# Patient Record
Sex: MALE | Race: WHITE | Employment: OTHER | URBAN - METROPOLITAN AREA
[De-identification: names, ages, dates, MRNs, and addresses within clinical notes are randomized per-mention and may not be internally consistent; named-entity substitution may affect disease eponyms.]

---

## 2016-11-16 LAB — COLONOSCOPY, EXTERNAL: NORMAL

## 2017-01-18 ENCOUNTER — HOSPITAL ENCOUNTER (OUTPATIENT)
Dept: GENERAL RADIOLOGY | Age: 67
Discharge: HOME | End: 2017-01-18
Payer: MEDICARE

## 2017-01-18 ENCOUNTER — DOCUMENTATION ONLY (OUTPATIENT)
Dept: INTERNAL MEDICINE CLINIC | Age: 67
End: 2017-01-18

## 2017-01-18 ENCOUNTER — OFFICE VISIT (OUTPATIENT)
Dept: INTERNAL MEDICINE CLINIC | Age: 67
End: 2017-01-18

## 2017-01-18 VITALS
OXYGEN SATURATION: 96 % | WEIGHT: 190 LBS | BODY MASS INDEX: 31.65 KG/M2 | SYSTOLIC BLOOD PRESSURE: 160 MMHG | TEMPERATURE: 98.9 F | HEART RATE: 81 BPM | HEIGHT: 65 IN | RESPIRATION RATE: 16 BRPM | DIASTOLIC BLOOD PRESSURE: 70 MMHG
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
## 2017-01-18 DIAGNOSIS — M25.472 LEFT ANKLE SWELLING: Primary | ICD-10-CM

## 2017-01-18 DIAGNOSIS — M25.472 LEFT ANKLE SWELLING: ICD-10-CM

## 2017-01-18 PROCEDURE — 73610 X-RAY EXAM OF ANKLE: CPT

## 2017-01-18 NOTE — MR AVS SNAPSHOT
Visit Information Date & Time Provider Department Dept. Phone Encounter #  
 1/18/2017  1:30 PM Denita Sever, NP [address] [facility] [phone] Your Appointments 3/8/2017  9:40 AM  
ACUTE CARE with [facility] ([address]) Appt Note: 6m hypertension, hyperlipidemia fu/ [address] [address] [facility] [facility] [address], [address] [place] [address]  
[address], [place] [address] [facility] [address] Upcoming Health Maintenance Date Due  
 GLAUCOMA SCREENING Q2Y 1/30/2017 Pneumococcal 65+ Low/Medium Risk (1 of 2 - PCV13) 7/1/2017* MEDICARE YEARLY EXAM 7/1/2017* DTaP/Tdap/Td series (2 - Td) 1/10/2024 COLONOSCOPY 11/16/2026 *Topic was postponed. The date shown is not the original due date. Allergies as of 1/18/2017  Review Complete On: 12/29/2016 By: Jina Vásquez LPN No Known Allergies Current Immunizations  Never Reviewed Name Date Influenza High Dose Vaccine PF 9/14/2016 11:40 AM  
 Influenza Vaccine 12/1/2015, 12/1/2013 Tdap 1/10/2014  1:07 PM  
 Zoster Vaccine, Live 12/1/2014 Not reviewed this visit You Were Diagnosed With   
  
 Codes Comments Left ankle swelling    -  Primary ICD-10-CM: M25.472 ICD-9-CM: 719.07 Fall (on) (from) other stairs and steps, initial encounter     ICD-10-CM: W10. Mya Flash ICD-9-CM: E880.9 Vitals BP Pulse Temp Resp Height(growth percentile) Weight(growth percentile) 160/70 (BP 1 Location: Left arm, BP Patient Position: Sitting) 81 98.9 °F (37.2 °C) (Oral) 16 5' 4.5\" (1.638 m) 190 lb (86.2 kg) SpO2 BMI Smoking Status 96% 32.11 kg/m2 Never Smoker Vitals History BMI and BSA Data Body Mass Index Body Surface Area  
 32.11 kg/m 2 1.98 m 2 Preferred Pharmacy Pharmacy Name Phone  [id number] [place], [address] [facility] [address] [phone] Your Updated Medication List  
  
   
This list is accurate as of: 1/18/17  2:03 PM.  Always use your most recent med list.  
  
  
  
  
 ASPIRIN PO Take 81 mg by mouth daily. atorvastatin 10 mg tablet Commonly known as:  LIPITOR  
TAKE 1 TABLET BY MOUTH EVERY NIGHT AT BEDTIME  
  
 lisinopril 10 mg tablet Commonly known as:  PRINIVIL, ZESTRIL  
TAKE 1 TABLET BY MOUTH DAILY METAMUCIL Powd Generic drug:  psyllium seed-sucrose Take  by mouth. Use twice daily To-Do List   
 01/18/2017 Imaging:  XR ANKLE LT MIN 3 V   
  
 03/08/2017 10:20 AM  
  Appointment with Mena Cummings at [place] [address] [facility] ([phone]) Patient Instructions Learning About RICE (Rest, Ice, Compression, and Elevation) What is RICE? RICE is a way to care for an injury. RICE helps relieve pain and swelling. It may also help with healing and flexibility. RICE stands for: · Rest and protect the injured or sore area. · Ice or a cold pack used as soon as possible. · Compression, or wrapping the injured or sore area with an elastic bandage. · Elevation (propping up) the injured or sore area. How do you do RICE? You can use RICE for home treatment when you have general aches and pains or after an injury or surgery. Rest 
· Do not put weight on the injury for at least 24 to 48 hours. · Use crutches for a badly sprained knee or ankle. · Support a sprained wrist, elbow, or shoulder with a sling. Ice · Put ice or a cold pack on the injury right away to reduce pain and swelling. Frozen vegetables will also work as an ice pack. Put a thin cloth between the ice or cold pack and your skin. The cloth protects the injured area from getting too cold. · Use ice for 10 to 15 minutes at a time for the first 48 to 72 hours. Compression · Use compression for sprains, strains, and surgeries of the arms and legs. · Wrap the injured area with an elastic bandage or compression sleeve to reduce swelling. · Don't wrap it too tightly. If the area below it feels numb, tingles, or feels cool, loosen the wrap. Elevation · Use elevation for areas of the body that can be propped up, such as arms and legs. · Prop up the injured area on pillows whenever you use ice. Keep it propped up anytime you sit or lie down. · Try to keep the injured area at or above the level of your heart. This will help reduce swelling and bruising. Where can you learn more? Go to http://jw-ryan.info/. Enter J887 in the search box to learn more about \"Learning About RICE (Rest, Ice, Compression, and Elevation). \" 
Current as of: May 23, 2016 Content Version: 11.1 © 0817-2195 [employer]. Care instructions adapted under license by [employer] (which disclaims liability or warranty for this information). If you have questions about a medical condition or this instruction, always ask your healthcare professional. [place] [address] any warranty or liability for your use of this information. Ankle Sprain: Care Instructions Your Care Instructions An ankle sprain can happen when you twist your ankle. The ligaments that support the ankle can get stretched and torn. Often the ankle is swollen and painful. Ankle sprains may take from several weeks to several months to heal. Usually, the more pain and swelling you have, the more severe your ankle sprain is and the longer it will take to heal. You can heal faster and regain strength in your ankle with good home treatment. It is very important to give your ankle time to heal completely, so that you do not easily hurt your ankle again. Follow-up care is a key part of your treatment and safety.  Be sure to make and go to all appointments, and call your doctor if you are having problems. It's also a good idea to know your test results and keep a list of the medicines you take. How can you care for yourself at home? · Prop up your foot on pillows as much as possible for the next 3 days. Try to keep your ankle above the level of your heart. This will help reduce the swelling. · Follow your doctor's directions for wearing a splint or elastic bandage. Wrapping the ankle may help reduce or prevent swelling. · Your doctor may give you a splint, a brace, an air stirrup, or another form of ankle support to protect your ankle until it is healed. Wear it as directed while your ankle is healing. Do not remove it unless your doctor tells you to. After your ankle has healed, ask your doctor whether you should wear the brace when you exercise. · Put ice or cold packs on your injured ankle for 10 to 20 minutes at a time. Try to do this every 1 to 2 hours for the next 3 days (when you are awake) or until the swelling goes down. Put a thin cloth between the ice and your skin. · You may need to use crutches until you can walk without pain. If you do use crutches, try to bear some weight on your injured ankle if you can do so without pain. This helps the ankle heal. 
· Take pain medicines exactly as directed. ¨ If the doctor gave you a prescription medicine for pain, take it as prescribed. ¨ If you are not taking a prescription pain medicine, ask your doctor if you can take an over-the-counter medicine. · If you have been given ankle exercises to do at home, do them exactly as instructed. These can promote healing and help prevent lasting weakness. When should you call for help? Call your doctor now or seek immediate medical care if: 
· Your pain is getting worse. · Your swelling is getting worse. · Your splint feels too tight or you are unable to loosen it. Watch closely for changes in your health, and be sure to contact your doctor if: 
· You are not getting better after 1 week. Where can you learn more? Go to http://jw-ryan.info/. Enter F969 in the search box to learn more about \"Ankle Sprain: Care Instructions. \" Current as of: May 23, 2016 Content Version: 11.1 © 0483-7978 [employer]. Care instructions adapted under license by [employer] (which disclaims liability or warranty for this information). If you have questions about a medical condition or this instruction, always ask your healthcare professional. [address] any warranty or liability for your use of this information. Introducing \[facility] [place] [facility]\"" & HEALTH SERVICES! Vanessa Block introduces [employer] patient portal. Now you can access parts of your medical record, email your doctor's office, and request medication refills online. 1. In your internet browser, go to [email or web address] 2. Click on the First Time User? Click Here link in the Sign In box. You will see the New Member Sign Up page. 3. Enter your [employer] Access Code exactly as it appears below. You will not need to use this code after youve completed the sign-up process. If you do not sign up before the expiration date, you must request a new code. · [employer] Access Code: [email or web address] Expires: 4/18/2017  2:02 PM 
 
4. Enter the last four digits of your Social Security Number (xxxx) and Date of Birth (mm/dd/yyyy) as indicated and click Submit. You will be taken to the next sign-up page. 5. Create a Nimsoftt ID. This will be your [employer] login ID and cannot be changed, so think of one that is secure and easy to remember. 6. Create a [employer] password. You can change your password at any time. 7. Enter your Password Reset Question and Answer. This can be used at a later time if you forget your password. 8. Enter your e-mail address. You will receive e-mail notification when new information is available in [address] 9. Click Sign Up. You can now view and download portions of your medical record. 10. Click the Download Summary menu link to download a portable copy of your medical information. If you have questions, please visit the Frequently Asked Questions section of the [email or web address] website. Remember, [email or web address] is NOT to be used for urgent needs. For medical emergencies, dial 911. Now available from your iPhone and Android! Please provide this summary of care documentation to your next provider. Your primary care clinician is listed as Brandi Bassett. If you have any questions after today's visit, please call [phone].

## 2017-01-18 NOTE — PATIENT INSTRUCTIONS
Learning About RICE (Rest, Ice, Compression, and Elevation)  What is RICE? RICE is a way to care for an injury. RICE helps relieve pain and swelling. It may also help with healing and flexibility. RICE stands for:  · Rest and protect the injured or sore area. · Ice or a cold pack used as soon as possible. · Compression, or wrapping the injured or sore area with an elastic bandage. · Elevation (propping up) the injured or sore area. How do you do RICE? You can use RICE for home treatment when you have general aches and pains or after an injury or surgery. Rest  · Do not put weight on the injury for at least 24 to 48 hours. · Use crutches for a badly sprained knee or ankle. · Support a sprained wrist, elbow, or shoulder with a sling. Ice  · Put ice or a cold pack on the injury right away to reduce pain and swelling. Frozen vegetables will also work as an ice pack. Put a thin cloth between the ice or cold pack and your skin. The cloth protects the injured area from getting too cold. · Use ice for 10 to 15 minutes at a time for the first 48 to 72 hours. Compression  · Use compression for sprains, strains, and surgeries of the arms and legs. · Wrap the injured area with an elastic bandage or compression sleeve to reduce swelling. · Don't wrap it too tightly. If the area below it feels numb, tingles, or feels cool, loosen the wrap. Elevation  · Use elevation for areas of the body that can be propped up, such as arms and legs. · Prop up the injured area on pillows whenever you use ice. Keep it propped up anytime you sit or lie down. · Try to keep the injured area at or above the level of your heart. This will help reduce swelling and bruising. Where can you learn more? Go to http://jw-ryan.info/. Enter K084 in the search box to learn more about \"Learning About RICE (Rest, Ice, Compression, and Elevation). \"  Current as of: May 23, 2016  Content Version: 11.1  © 2522-0902 Healthwise, Incorporated. Care instructions adapted under license by [employer] (which disclaims liability or warranty for this information). If you have questions about a medical condition or this instruction, always ask your healthcare professional. [place] [address] any warranty or liability for your use of this information. Ankle Sprain: Care Instructions  Your Care Instructions    An ankle sprain can happen when you twist your ankle. The ligaments that support the ankle can get stretched and torn. Often the ankle is swollen and painful. Ankle sprains may take from several weeks to several months to heal. Usually, the more pain and swelling you have, the more severe your ankle sprain is and the longer it will take to heal. You can heal faster and regain strength in your ankle with good home treatment. It is very important to give your ankle time to heal completely, so that you do not easily hurt your ankle again. Follow-up care is a key part of your treatment and safety. Be sure to make and go to all appointments, and call your doctor if you are having problems. It's also a good idea to know your test results and keep a list of the medicines you take. How can you care for yourself at home? · Prop up your foot on pillows as much as possible for the next 3 days. Try to keep your ankle above the level of your heart. This will help reduce the swelling. · Follow your doctor's directions for wearing a splint or elastic bandage. Wrapping the ankle may help reduce or prevent swelling. · Your doctor may give you a splint, a brace, an air stirrup, or another form of ankle support to protect your ankle until it is healed. Wear it as directed while your ankle is healing. Do not remove it unless your doctor tells you to. After your ankle has healed, ask your doctor whether you should wear the brace when you exercise.   · Put ice or cold packs on your injured ankle for 10 to 20 minutes at a time. Try to do this every 1 to 2 hours for the next 3 days (when you are awake) or until the swelling goes down. Put a thin cloth between the ice and your skin. · You may need to use crutches until you can walk without pain. If you do use crutches, try to bear some weight on your injured ankle if you can do so without pain. This helps the ankle heal.  · Take pain medicines exactly as directed. ¨ If the doctor gave you a prescription medicine for pain, take it as prescribed. ¨ If you are not taking a prescription pain medicine, ask your doctor if you can take an over-the-counter medicine. · If you have been given ankle exercises to do at home, do them exactly as instructed. These can promote healing and help prevent lasting weakness. When should you call for help? Call your doctor now or seek immediate medical care if:  · Your pain is getting worse. · Your swelling is getting worse. · Your splint feels too tight or you are unable to loosen it. Watch closely for changes in your health, and be sure to contact your doctor if:  · You are not getting better after 1 week. Where can you learn more? Go to http://jw-ryan.info/. Enter E543 in the search box to learn more about \"Ankle Sprain: Care Instructions. \"  Current as of: May 23, 2016  Content Version: 11.1  © 3275-6147 Healthwise, Incorporated. Care instructions adapted under license by [employer] (which disclaims liability or warranty for this information). If you have questions about a medical condition or this instruction, always ask your healthcare professional. [place] [address] any warranty or liability for your use of this information.

## 2017-01-18 NOTE — PROGRESS NOTES
Chief Complaint   Patient presents with   [facility] Fall      left ankle sprain     Reviewed record in preparation for visit and have obtained necessary documentation. Identified pt with two pt identifiers(name and ). Health Maintenance Due   Topic    GLAUCOMA SCREENING Q2Y          Chief Complaint   Patient presents with    Fall      left ankle sprain        Wt Readings from Last 3 Encounters:   17 190 lb (86.2 kg)   16 191 lb (86.6 kg)   16 193 lb (87.5 kg)     Temp Readings from Last 3 Encounters:   17 98.9 °F (37.2 °C) (Oral)   16 97.9 °F (36.6 °C) (Oral)   16 98.5 °F (36.9 °C)     BP Readings from Last 3 Encounters:   17 160/70   16 140/70   16 109/55     Pulse Readings from Last 3 Encounters:   17 81   16 82   16 65           Learning Assessment:  :     Learning Assessment 3/11/2016 2014 1/10/2014   PRIMARY LEARNER Patient Patient Patient   HIGHEST LEVEL OF EDUCATION - PRIMARY LEARNER  4 YEARS OF COLLEGE - -   BARRIERS PRIMARY LEARNER NONE NONE NONE   CO-LEARNER CAREGIVER No No No   CO-LEARNER HIGHEST LEVEL OF EDUCATION - - 2 YEARS OF COLLEGE   PRIMARY LANGUAGE ENGLISH ENGLISH ENGLISH    NEED No No No   LEARNER PREFERENCE PRIMARY LISTENING DEMONSTRATION READING   LEARNING SPECIAL TOPICS - no no   ANSWERED BY patient patient patient   RELATIONSHIP SELF SELF SELF       Depression Screening:  :     PHQ 2 / 9, over the last two weeks 2016   Little interest or pleasure in doing things Not at all   Feeling down, depressed or hopeless Not at all   Total Score PHQ 2 0       Fall Risk Assessment:  :     Fall Risk Assessment, last 12 mths 2016   Able to walk? Yes   Fall in past 12 months? No       Abuse Screening:  :     Abuse Screening Questionnaire 3/11/2016 9/15/2014   Do you ever feel afraid of your partner? N N   Are you in a relationship with someone who physically or mentally threatens you? N N   Is it safe for you to go home? Y Y       Coordination of Care Questionnaire:  :     1) Have you been to an emergency room, urgent care clinic since your last visit? no   Hospitalized since your last visit? no             2) Have you seen or consulted any other health care providers outside of [address] [place] [address] since your last visit? no  (Include any pap smears or colon screenings in this section.)    3) Do you have an Advance Directive on file? no    4) Are you interested in receiving information on Advance Directives? NO      Patient is accompanied by self I have received verbal consent from Tyra Orozco to discuss any/all medical information while they are present in the room. Reviewed record  In preparation for visit and have obtained necessary documentation.

## 2017-01-18 NOTE — PROGRESS NOTES
HISTORY OF PRESENT ILLNESS  Madiha Herrera is a 78 y.o. male. Patient reports he fell down three steps last night and injured left ankle. He applied ice last night, but not today. He is able to walk, but is slightly painful. Fall   The history is provided by the patient. The accident occurred yesterday. Fall occurred: down 3 stairs. Pain location: left ankle. The pain is mild. Pertinent negatives include no loss of consciousness and no laceration. The symptoms are aggravated by ambulation. He has tried acetaminophen for the symptoms. The treatment provided no relief. Review of Systems   Musculoskeletal: Positive for falls. Left ankle pain and swelling   Neurological: Negative for loss of consciousness. Physical Exam   Constitutional: He is oriented to person, place, and time. He appears well-developed and well-nourished. Musculoskeletal:        Left ankle: He exhibits decreased range of motion (dorsiflexion), swelling (left lateral malleolus) and ecchymosis (minimal around lateral malleolus). He exhibits no deformity, no laceration and normal pulse. Tenderness. Lateral malleolus (mild) tenderness found. Neurological: He is alert and oriented to person, place, and time. Skin: Skin is warm and dry. No laceration noted. Psychiatric: He has a normal mood and affect. ASSESSMENT and PLAN    ICD-10-CM ICD-9-CM    1. Left ankle swelling M25.472 719.07 XR ANKLE LT MIN 3 V   2. Fall (on) (from) other stairs and steps, initial encounter W10. 8XXA E880.9 XR ANKLE LT MIN 3 V     Orders Placed This Encounter    XR ANKLE LT MIN 3 V   advised patient it is most likely just a sprain, but xray ordered to confirm  Ice q 2-3 hours for 2 days for 10-15 minutes  Ibuprofen for swelling and pain  Ace wrap for the next week, possibly longer  Patient informed ankle may be sore for 4-6 weeks

## 2017-01-21 RX ORDER — ATORVASTATIN CALCIUM 10 MG/1
TABLET, FILM COATED ORAL
Qty: 90 TAB | Refills: 1 | Status: SHIPPED | OUTPATIENT
Start: 2017-01-21 | End: 2017-07-18 | Stop reason: SDUPTHER

## 2017-02-05 RX ORDER — LISINOPRIL 10 MG/1
TABLET ORAL
Qty: 90 TAB | Refills: 2 | Status: SHIPPED | OUTPATIENT
Start: 2017-02-05 | End: 2017-11-09 | Stop reason: SDUPTHER

## 2017-03-08 ENCOUNTER — OFFICE VISIT (OUTPATIENT)
Dept: INTERNAL MEDICINE CLINIC | Age: 67
End: 2017-03-08

## 2017-03-08 ENCOUNTER — HOSPITAL ENCOUNTER (OUTPATIENT)
Dept: LAB | Age: 67
Discharge: HOME | End: 2017-03-08
Payer: MEDICARE

## 2017-03-08 VITALS
WEIGHT: 192 LBS | SYSTOLIC BLOOD PRESSURE: 118 MMHG | BODY MASS INDEX: 31.99 KG/M2 | HEIGHT: 65 IN | OXYGEN SATURATION: 98 % | HEART RATE: 68 BPM | DIASTOLIC BLOOD PRESSURE: 72 MMHG | TEMPERATURE: 97.7 F

## 2017-03-08 DIAGNOSIS — S93.402D SPRAIN OF LEFT ANKLE, UNSPECIFIED LIGAMENT, SUBSEQUENT ENCOUNTER: ICD-10-CM

## 2017-03-08 DIAGNOSIS — Z13.5 GLAUCOMA SCREENING: ICD-10-CM

## 2017-03-08 DIAGNOSIS — I10 BENIGN ESSENTIAL HYPERTENSION: Primary | ICD-10-CM

## 2017-03-08 DIAGNOSIS — Z71.89 ADVANCED CARE PLANNING/COUNSELING DISCUSSION: ICD-10-CM

## 2017-03-08 DIAGNOSIS — R73.9 HYPERGLYCEMIA: ICD-10-CM

## 2017-03-08 DIAGNOSIS — Z87.898 HISTORY OF ELEVATED PSA: ICD-10-CM

## 2017-03-08 DIAGNOSIS — Z23 ENCOUNTER FOR IMMUNIZATION: ICD-10-CM

## 2017-03-08 DIAGNOSIS — E78.00 HYPERCHOLESTEROLEMIA: ICD-10-CM

## 2017-03-08 DIAGNOSIS — R73.03 PREDIABETES: ICD-10-CM

## 2017-03-08 DIAGNOSIS — Z00.00 MEDICARE ANNUAL WELLNESS VISIT, SUBSEQUENT: ICD-10-CM

## 2017-03-08 PROCEDURE — 85027 COMPLETE CBC AUTOMATED: CPT

## 2017-03-08 PROCEDURE — 80053 COMPREHEN METABOLIC PANEL: CPT

## 2017-03-08 PROCEDURE — 80061 LIPID PANEL: CPT

## 2017-03-08 PROCEDURE — 36415 COLL VENOUS BLD VENIPUNCTURE: CPT

## 2017-03-08 PROCEDURE — 83036 HEMOGLOBIN GLYCOSYLATED A1C: CPT

## 2017-03-08 NOTE — PROGRESS NOTES
Jose Youssef is a 78 y.o. male and presents for annual Medicare Wellness Visit. Problem List: Reviewed with patient and discussed risk factors. Patient Active Problem List   Diagnosis Code    Incomplete RBBB I45.10    Hypercholesterolemia E78.00    Benign essential hypertension I10    Prediabetes R73.03    Obesity E66.9    History of elevated PSA Z87.898    Advanced care planning/counseling discussion Z71.89       Current medical providers:  Patient Care Team:  JOE Cao as PCP - General (Physician Assistant)    PSH: Reviewed with patient  Past Surgical History:   Procedure Laterality Date    COLONOSCOPY N/A 11/16/2016    COLONOSCOPY performed by Fabio Clark [facility] at [facility] ENDOSCOPY    HX COLONOSCOPY  11/17/16    Dr. Shaneka Adair        SH: Reviewed with patient  Social History   Substance Use Topics    Smoking status: Never Smoker    Smokeless tobacco: Never Used    Alcohol use 0.0 oz/week     0 Standard drinks or equivalent per week      Comment: rarely       FH: Reviewed with patient  Family History   Problem Relation Age of Onset    Stroke Mother     Diabetes Mother     Cancer Father      colon    Cancer Brother      bone    No Known Problems Son        Medications/Allergies: Reviewed with patient  Current Outpatient Prescriptions on File Prior to Visit   Medication Sig Dispense Refill    lisinopril (PRINIVIL, ZESTRIL) 10 mg tablet TAKE 1 TABLET BY MOUTH DAILY 90 Tab 2    atorvastatin (LIPITOR) 10 mg tablet TAKE 1 TABLET BY MOUTH EVERY NIGHT AT BEDTIME 90 Tab 1    ASPIRIN PO Take 81 mg by mouth daily.  psyllium seed-sucrose (METAMUCIL) powd Take  by mouth. Use twice daily       No current facility-administered medications on file prior to visit.        No Known Allergies    Objective:  Visit Vitals    /72 (BP 1 Location: Left arm, BP Patient Position: Sitting)    Pulse 68    Temp 97.7 °F (36.5 °C) (Oral)    Ht 5' 4.57\" (1.64 m)    Wt 192 lb (87.1 kg)    SpO2 98%    BMI 32.38 kg/m2    Body mass index is 32.38 kg/(m^2). Assessment of cognitive impairment: Alert and oriented x 3    Depression Screen:   PHQ 2 / 9, over the last two weeks 3/8/2017   Little interest or pleasure in doing things Not at all   Feeling down, depressed or hopeless Not at all   Total Score PHQ 2 0       Fall Risk Assessment:    Fall Risk Assessment, last 12 mths 3/8/2017   Able to walk? Yes   Fall in past 12 months? Yes   Fall with injury? Yes   Number of falls in past 12 months 1   Fall Risk Score 2       Functional Ability:   Does the patient exhibit a steady gait? yes   How long did it take the patient to get up and walk from a sitting position? 5 seconds   Is the patient self reliant?  (ie can do own laundry, meals, household chores)  yes     Does the patient handle his/her own medications? yes     Does the patient handle his/her own money? yes     Is the patients home safe (ie good lighting, handrails on stairs and bath, etc.)? yes     Did you notice or did patient express any hearing difficulties? Yes; mild     Did you notice or did patient express any vision difficulties?   no     Were distance and reading eye charts used? no       Advance Care Planning:   Patient was offered the opportunity to discuss advance care planning:  yes     Does patient have an Advance Directive:  no   If no, did you provide information on Caring Connections?   yes       Plan:      Orders Placed This Encounter    METABOLIC PANEL, COMPREHENSIVE    LIPID PANEL    HEMOGLOBIN A1C WITH EAG    CBC W/O DIFF    REFERRAL TO OPHTHALMOLOGY    pneumococcal 13 avel conj dip (PREVNAR-13) 0.5 mL syrg injection       Health Maintenance   Topic Date Due    GLAUCOMA SCREENING Q2Y  01/30/2017    Pneumococcal 65+ Low/Medium Risk (1 of 2 - PCV13) 07/01/2017 (Originally 7/30/2014)    MEDICARE YEARLY EXAM  03/09/2018    DTaP/Tdap/Td series (2 - Td) 01/10/2024    COLONOSCOPY  11/16/2026    Hepatitis C Screening Completed    ZOSTER VACCINE AGE 60>  Addressed    INFLUENZA AGE 9 TO ADULT  Completed       *Patient verbalized understanding and agreement with the plan. A copy of the After Visit Summary with personalized health plan was given to the patient today. Patient here today for a subsequent medicare wellness visit. Mr. Aline Malik is independent in all ADL's. Exercise consists of walking and the elliptical and he does one or the other every day. UTD on flu, TDAP and zostavax. ADM provided rx for Prevnar 13 today. Patient is due for an eye exam and will schedule  Last colonoscopy was 11/2016; normal (Dr. Farah Nittany)  Labs ordered today. Patient was advised to f/u with urology. Believes he has an advanced medical directive with his will. Encouraged him to check for the documentation and offered to scan a copy into the chart. Provided a copy of AMD form in case he does not have one at home.

## 2017-03-08 NOTE — PROGRESS NOTES
Chief Complaint   Patient presents with    Hypertension     6 month f/u    Cholesterol Problem    Annual Wellness Visit

## 2017-03-08 NOTE — LETTER
3/9/2017 6:18 AM 
 
RE:    Hafsa Hinson [phone] [id number] Thank you for agreeing to see Zandra Berkowitz I am referring my patient to you for evaluation of elevated PSA velocity. Please see his 
pertinent patient information enclosed. I appreciate your assistance in Mr. Wyatt Chen care  and look forward to your findings and recommendations. Sincerely, JOE Artis

## 2017-03-08 NOTE — PROGRESS NOTES
Subjective:      Debbie Hillman is a 78 y.o. male who presents today for HTN and HLD follow up with me and medicare wellness with Solomon. He still exercises 3-4x/week (walking, elliptical). He monitors his diet closely. No significant weight change. Reports adherence with all medications. Denies HA, dizziness, chest pain, SOB, peripheral edema. He was diagnosed with L ankle sprain by Paris Triplett in January. He notes that it is \"much better\" but he still has a little swelling. Denies any pain. He has not followed up with Dr. Cherylene Figures yet. \"I was going to go this month. \"    Due for glaucoma exam and prevnar-13. Current Outpatient Prescriptions   Medication Sig Dispense Refill    lisinopril (PRINIVIL, ZESTRIL) 10 mg tablet TAKE 1 TABLET BY MOUTH DAILY 90 Tab 2    atorvastatin (LIPITOR) 10 mg tablet TAKE 1 TABLET BY MOUTH EVERY NIGHT AT BEDTIME 90 Tab 1    ASPIRIN PO Take 81 mg by mouth daily.  psyllium seed-sucrose (METAMUCIL) powd Take  by mouth. Use twice daily       No Known Allergies  Past Medical History:   Diagnosis Date    Benign essential hypertension     History of elevated PSA     2.4 (11/2012), 3.6 (1/2014), 2.1 (3/2015) evaluated by Dr. Charlene Domingo, repeat PSA 1.9 (2/21/14); 2.1 (3/16/15), 2.4 (3/11/16), 2.7 (9/2016) - referred back to Dr. Escobar Sessions Hypercholesterolemia     Incomplete RBBB 1/10/14    Obesity     Prediabetes 5/13/14        Review of Systems    Pertinent items are noted in HPI.      Objective:     Visit Vitals    /72 (BP 1 Location: Left arm, BP Patient Position: Sitting)    Pulse 68    Temp 97.7 °F (36.5 °C) (Oral)    Ht 5' 4.57\" (1.64 m)    Wt 192 lb (87.1 kg)    SpO2 98%    BMI 32.38 kg/m2     General appearance: alert, cooperative, no distress, appears stated age, pleasant, white, overweight appearing male  Neck: supple, symmetrical, trachea midline, no adenopathy and no carotid bruit  Lungs: clear to auscultation bilaterally  Heart: regular rate and rhythm, S1, S2 normal, no murmur, click, rub or gallop  Abdomen: soft, non-tender. Bowel sounds normal. No masses,  no organomegaly  Rectal: Deferred to Urology  Extremities: mild non-pitting edema inferior to lateral malleolus, non-tender, no skin changes, full ROM, non-tender  Pulses: pt 2+ and symmetric  Neurologic: Grossly normal    Assessment/Plan:   Benign essential hypertension - well controlled  -     CBC W/O DIFF    Hypercholesterolemia  -     METABOLIC PANEL, COMPREHENSIVE  -     LIPID PANEL    Prediabetes  -     METABOLIC PANEL, COMPREHENSIVE  -     HEMOGLOBIN A1C WITH EAG    Hyperglycemia  -     METABOLIC PANEL, COMPREHENSIVE  -     HEMOGLOBIN A1C WITH EAG    BMI 32.0-32.9,adult  I have reviewed/discussed the above normal BMI with the patient. I have recommended the following interventions: dietary management education, guidance, and counseling and monitor weight . The plan is as follows: I have counseled this patient on diet and exercise regimens. .      History of elevated PSA velocity - schedule f/u appt with Dr. Jennifer Lord    Glaucoma screening  -     REFERRAL TO OPHTHALMOLOGY    Encounter for immunization  -     pneumococcal 13 avel conj dip (PREVNAR-13) 0.5 mL syrg injection; 0.5 mL by IntraMUSCular route once for 1 dose. PLEASE FAX RECEIPT OF ADMINISTRATION TO OFFICE ([phone])    Sprain of left ankle, unspecified ligament, subsequent encounter - resolving. Reassured about mild edema. Medicare annual wellness visit, subsequent - See Nurse Navigator note for Medicare Wellness exam.    Advanced care planning/counseling discussion    cc Dr. Rodrigo Mccormick    Follow-up Disposition:  Return in about 6 months (around 9/8/2017) for re-establish care with Dr. Mary Ann Mary. Advised him to call back or return to office if symptoms worsen/change/persist.  Discussed expected course/resolution/complications of diagnosis in detail with patient.     Medication risks/benefits/costs/interactions/alternatives discussed with patient. He was given an after visit summary which includes diagnoses, current medications, & vitals. He expressed understanding with the diagnosis and plan.     Rafael Lopez PA-C

## 2017-03-08 NOTE — MR AVS SNAPSHOT
Visit Information Date & Time Provider Department Dept. Phone Encounter #  
 3/8/2017  9:40 AM Luis A Toure, [address] [facility] [phone] [id number] Follow-up Instructions Return in about 6 months (around 9/8/2017) for re-establish care with Dr. Fernando Frausto. Upcoming Health Maintenance Date Due  
 GLAUCOMA SCREENING Q2Y 1/30/2017 Pneumococcal 65+ Low/Medium Risk (1 of 2 - PCV13) 7/1/2017* MEDICARE YEARLY EXAM 3/9/2018 DTaP/Tdap/Td series (2 - Td) 1/10/2024 COLONOSCOPY 11/16/2026 *Topic was postponed. The date shown is not the original due date. Allergies as of 3/8/2017  Review Complete On: 3/8/2017 By: Demetris Serrato LPN No Known Allergies Current Immunizations  Never Reviewed Name Date Influenza High Dose Vaccine PF 9/14/2016 11:40 AM  
 Influenza Vaccine 12/1/2015, 12/1/2013 Tdap 1/10/2014  1:07 PM  
 Zoster Vaccine, Live 12/1/2014 Not reviewed this visit You Were Diagnosed With   
  
 Codes Comments Benign essential hypertension    -  Primary ICD-10-CM: I10 
ICD-9-CM: 401.1 Hypercholesterolemia     ICD-10-CM: E78.00 ICD-9-CM: 272.0 Prediabetes     ICD-10-CM: R73.03 
ICD-9-CM: 790.29 Hyperglycemia     ICD-10-CM: R73.9 ICD-9-CM: 790.29 BMI 32.0-32.9,adult     ICD-10-CM: M98.78 
ICD-9-CM: V85.32 History of elevated PSA     ICD-10-CM: Z87.898 ICD-9-CM: V13.89 Glaucoma screening     ICD-10-CM: Z13.5 ICD-9-CM: V80.1 Encounter for immunization     ICD-10-CM: P35 ICD-9-CM: V03.89 Sprain of left ankle, unspecified ligament, subsequent encounter     ICD-10-CM: D08.269H ICD-9-CM: 845.00 Medicare annual wellness visit, subsequent     ICD-10-CM: Z00.00 ICD-9-CM: V70.0 Advanced care planning/counseling discussion     ICD-10-CM: Z71.89 ICD-9-CM: V65.49 Vitals BP Pulse Temp Height(growth percentile) Weight(growth percentile) SpO2 118/72 (BP 1 Location: Left arm, BP Patient Position: Sitting) 68 97.7 °F (36.5 °C) (Oral) 5' 4.57\" (1.64 m) 192 lb (87.1 kg) 98% BMI Smoking Status 32.38 kg/m2 Never Smoker Vitals History BMI and BSA Data Body Mass Index Body Surface Area  
 32.38 kg/m 2 1.99 m 2 Preferred Pharmacy Pharmacy Name Phone [id number], [address] [place] [address] [phone] Your Updated Medication List  
  
   
This list is accurate as of: 3/8/17 10:10 AM.  Always use your most recent med list.  
  
  
  
  
 ASPIRIN PO Take 81 mg by mouth daily. atorvastatin 10 mg tablet Commonly known as:  LIPITOR  
TAKE 1 TABLET BY MOUTH EVERY NIGHT AT BEDTIME  
  
 lisinopril 10 mg tablet Commonly known as:  PRINIVIL, ZESTRIL  
TAKE 1 TABLET BY MOUTH DAILY METAMUCIL (SUGAR) Powd Generic drug:  psyllium seed-sucrose Take  by mouth. Use twice daily  
  
 pneumococcal 13 avel conj dip 0.5 mL Syrg injection Commonly known as:  PREVNAR-13  
0.5 mL by IntraMUSCular route once for 1 dose. PLEASE FAX RECEIPT OF ADMINISTRATION TO OFFICE ([phone]) Prescriptions Printed Refills  
 pneumococcal 13 avel conj dip (PREVNAR-13) 0.5 mL syrg injection 0 Si.5 mL by IntraMUSCular route once for 1 dose. PLEASE FAX RECEIPT OF ADMINISTRATION TO OFFICE ([phone]) Class: Print Route: IntraMUSCular We Performed the Following CBC W/O DIFF [15273 CPT(R)] HEMOGLOBIN A1C WITH EAG [56575 CPT(R)] LIPID PANEL [91492 CPT(R)] METABOLIC PANEL, COMPREHENSIVE [28661 CPT(R)] REFERRAL TO OPHTHALMOLOGY [REF57 Custom] Follow-up Instructions Return in about 6 months (around 2017) for re-establish care with Dr. Hayden Keating. To-Do List   
 2017 10:20 AM  
  Appointment with Marcelino Cummings at [facility] ([phone]) Referral Information Referral ID Referred By Referred To  
  
 [id number] Melissa Molina [facility] [address] [place], [address] Visits Status Start Date End Date 1 New Request 3/8/17 3/8/18 If your referral has a status of pending review or denied, additional information will be sent to support the outcome of this decision. Patient Instructions Schedule of Personalized Health Plan (Provide Copy to Patient) The best way to stay healthy is to live a healthy lifestyle. A healthy lifestyle includes regular exercise, eating a well-balanced diet, keeping a healthy weight and not smoking. Regular physical exams and screening tests are another important way to take care of yourself. Preventive exams provided by health care providers can find health problems early when treatment works best and can keep you from getting certain diseases or illnesses. Preventive services include exams, lab tests, screenings, shots, monitoring and information to help you take care of your own health. All people over 65 should have a pneumonia shot. Pneumonia shots are usually only needed once in a lifetime unless your doctor decides differently. (Prevnar 13 prescription provided today) All people over 65 should have a yearly flu shot. (completed 9/2016) People over 65 are at medium to high risk for Hepatitis B. Three shots are needed for complete protection. In addition to your physical exam, some screening tests are recommended: 
 
Diabetes Mellitus screening is recommended every year.(lab ordered ) Glaucoma is an eye disease caused by high pressure in the eye. An eye exam is recommended every year. (recommended) Cardiovascular screening tests that check your cholesterol and other blood fat (lipid) levels are recommended every five years. (ordered today) Colorectal Cancer screening tests help to find pre-cancerous polyps (growths in the colon) so they can be removed before they turn into cancer. Tests ordered for screening depend on your personal and family history risk factors. (11/16/16 - Dr. Allyson Jean-Baptiste) Screening for Prostate Cancer is recommended yearly with a digital rectal exam and/or a PSA test (completed 9/2016) Here is a list of your current Health Maintenance items with a due date: 
Health Maintenance Topic Date Due  GLAUCOMA SCREENING Q2Y  01/30/2017  Pneumococcal 65+ Low/Medium Risk (1 of 2 - PCV13) 07/01/2017 (Originally 7/30/2014)  MEDICARE YEARLY EXAM  03/09/2018  DTaP/Tdap/Td series (2 - Td) 01/10/2024  COLONOSCOPY  11/16/2026  Hepatitis C Screening  Completed  ZOSTER VACCINE AGE 60>  Addressed  INFLUENZA AGE 9 TO ADULT  Completed Please follow up with urology. Stay active;  
Bring in a copy of advanced medical directive for the chart. Introducing \[facility] [place] [facility]\"" & HEALTH SERVICES! [facility] introduces [employer] patient portal. Now you can access parts of your medical record, email your doctor's office, and request medication refills online. 1. In your internet browser, go to [email or web address] 2. Click on the First Time User? Click Here link in the Sign In box. You will see the New Member Sign Up page. 3. Enter your [employer] Access Code exactly as it appears below. You will not need to use this code after youve completed the sign-up process. If you do not sign up before the expiration date, you must request a new code. · [employer] Access Code: [email or web address] Expires: 4/18/2017  2:02 PM 
 
4. Enter the last four digits of your Social Security Number (xxxx) and Date of Birth (mm/dd/yyyy) as indicated and click Submit. You will be taken to the next sign-up page. 5. Create a [employer] ID. This will be your [employer] login ID and cannot be changed, so think of one that is secure and easy to remember. 6. Create a [employer] password. You can change your password at any time. 7. Enter your Password Reset Question and Answer. This can be used at a later time if you forget your password. 8. Enter your e-mail address. You will receive e-mail notification when new information is available in [address] 9. Click Sign Up. You can now view and download portions of your medical record. 10. Click the Download Summary menu link to download a portable copy of your medical information. If you have questions, please visit the Frequently Asked Questions section of the [employer] website. Remember, [employer] is NOT to be used for urgent needs. For medical emergencies, dial 911. Now available from your iPhone and Android! Please provide this summary of care documentation to your next provider. Your primary care clinician is listed as Priscilla Thomas. If you have any questions after today's visit, please call [phone].

## 2017-03-08 NOTE — PATIENT INSTRUCTIONS
Schedule of Personalized Health Plan  (Provide Copy to Patient)  The best way to stay healthy is to live a healthy lifestyle. A healthy lifestyle includes regular exercise, eating a well-balanced diet, keeping a healthy weight and not smoking. Regular physical exams and screening tests are another important way to take care of yourself. Preventive exams provided by health care providers can find health problems early when treatment works best and can keep you from getting certain diseases or illnesses. Preventive services include exams, lab tests, screenings, shots, monitoring and information to help you take care of your own health. All people over 65 should have a pneumonia shot. Pneumonia shots are usually only needed once in a lifetime unless your doctor decides differently. (Prevnar 13 prescription provided today)    All people over 65 should have a yearly flu shot. (completed 9/2016)    People over 65 are at medium to high risk for Hepatitis B. Three shots are needed for complete protection. In addition to your physical exam, some screening tests are recommended:    Diabetes Mellitus screening is recommended every year.(lab ordered )    Glaucoma is an eye disease caused by high pressure in the eye. An eye exam is recommended every year. (recommended)    Cardiovascular screening tests that check your cholesterol and other blood fat (lipid) levels are recommended every five years. (ordered today)    Colorectal Cancer screening tests help to find pre-cancerous polyps (growths in the colon) so they can be removed before they turn into cancer. Tests ordered for screening depend on your personal and family history risk factors.  (11/16/16 - Dr. Jen Grace)    Screening for Prostate Cancer is recommended yearly with a digital rectal exam and/or a PSA test (completed 9/2016)    Here is a list of your current Health Maintenance items with a due date:  Health Maintenance   Topic Date Due    GLAUCOMA SCREENING Q2Y 01/30/2017    Pneumococcal 65+ Low/Medium Risk (1 of 2 - PCV13) 07/01/2017 (Originally 7/30/2014)    MEDICARE YEARLY EXAM  03/09/2018    DTaP/Tdap/Td series (2 - Td) 01/10/2024    COLONOSCOPY  11/16/2026    Hepatitis C Screening  Completed    ZOSTER VACCINE AGE 60>  Addressed    INFLUENZA AGE 9 TO ADULT  Completed     Please follow up with urology. Stay active;   Bring in a copy of advanced medical directive for the chart.

## 2017-03-09 LAB
ALBUMIN SERPL-MCNC: 4.6 G/DL (ref 3.6–4.8)
ALBUMIN/GLOB SERPL: 1.6 {RATIO} (ref 1.1–2.5)
ALP SERPL-CCNC: 71 IU/L (ref 39–117)
ALT SERPL-CCNC: 18 IU/L (ref 0–44)
AST SERPL-CCNC: 17 IU/L (ref 0–40)
BILIRUB SERPL-MCNC: 0.4 MG/DL (ref 0–1.2)
BUN SERPL-MCNC: 20 MG/DL (ref 8–27)
BUN/CREAT SERPL: 19 (ref 10–22)
CALCIUM SERPL-MCNC: 9.4 MG/DL (ref 8.6–10.2)
CHLORIDE SERPL-SCNC: 98 MMOL/L (ref 96–106)
CHOLEST SERPL-MCNC: 162 MG/DL (ref 100–199)
CO2 SERPL-SCNC: 20 MMOL/L (ref 18–29)
CREAT SERPL-MCNC: 1.08 MG/DL (ref 0.76–1.27)
ERYTHROCYTE [DISTWIDTH] IN BLOOD BY AUTOMATED COUNT: 13.6 % (ref 12.3–15.4)
EST. AVERAGE GLUCOSE BLD GHB EST-MCNC: 126 MG/DL
GLOBULIN SER CALC-MCNC: 2.9 G/DL (ref 1.5–4.5)
GLUCOSE SERPL-MCNC: 111 MG/DL (ref 65–99)
HBA1C MFR BLD: 6 % (ref 4.8–5.6)
HCT VFR BLD AUTO: 42.2 % (ref 37.5–51)
HDLC SERPL-MCNC: 45 MG/DL
HGB BLD-MCNC: 14.2 G/DL (ref 12.6–17.7)
INTERPRETATION, 910389: NORMAL
LDLC SERPL CALC-MCNC: 90 MG/DL (ref 0–99)
MCH RBC QN AUTO: 30.6 PG (ref 26.6–33)
MCHC RBC AUTO-ENTMCNC: 33.6 G/DL (ref 31.5–35.7)
MCV RBC AUTO: 91 FL (ref 79–97)
PLATELET # BLD AUTO: 305 X10E3/UL (ref 150–379)
POTASSIUM SERPL-SCNC: 5.1 MMOL/L (ref 3.5–5.2)
PROT SERPL-MCNC: 7.5 G/DL (ref 6–8.5)
RBC # BLD AUTO: 4.64 X10E6/UL (ref 4.14–5.8)
SODIUM SERPL-SCNC: 138 MMOL/L (ref 134–144)
TRIGL SERPL-MCNC: 137 MG/DL (ref 0–149)
VLDLC SERPL CALC-MCNC: 27 MG/DL (ref 5–40)
WBC # BLD AUTO: 6.9 X10E3/UL (ref 3.4–10.8)

## 2017-03-16 ENCOUNTER — TELEPHONE (OUTPATIENT)
Dept: INTERNAL MEDICINE CLINIC | Age: 67
End: 2017-03-16

## 2017-03-16 NOTE — TELEPHONE ENCOUNTER
----- Message from Camron Dean sent at 3/15/2017  4:59 PM EDT -----  Regarding: JOE Smallwood  Pt request a call back with lab results from recent cpe . contact number [phone]

## 2017-03-16 NOTE — TELEPHONE ENCOUNTER
Provider did not have PSA testing done during his last office visit d/t being referred to Dr. Mela Gustafson. Pt was advised of this information and verbalized understanding. No further questions and/or concerns at this time.

## 2017-07-18 RX ORDER — ATORVASTATIN CALCIUM 10 MG/1
TABLET, FILM COATED ORAL
Qty: 90 TAB | Refills: 3 | Status: SHIPPED | OUTPATIENT
Start: 2017-07-18 | End: 2018-06-30 | Stop reason: SDUPTHER

## 2017-07-18 NOTE — TELEPHONE ENCOUNTER
Last office visit- 3/8/17  Next office visit- 9/1917  Last Refill- 1/27/17    Requested Prescriptions     Pending Prescriptions Disp Refills    atorvastatin (LIPITOR) 10 mg tablet 90 Tab 1

## 2017-09-19 ENCOUNTER — HOSPITAL ENCOUNTER (OUTPATIENT)
Dept: LAB | Age: 67
Discharge: HOME | End: 2017-09-19
Payer: MEDICARE

## 2017-09-19 ENCOUNTER — OFFICE VISIT (OUTPATIENT)
Dept: INTERNAL MEDICINE CLINIC | Age: 67
End: 2017-09-19

## 2017-09-19 VITALS
SYSTOLIC BLOOD PRESSURE: 160 MMHG | HEART RATE: 76 BPM | WEIGHT: 193 LBS | TEMPERATURE: 98.7 F | DIASTOLIC BLOOD PRESSURE: 90 MMHG | RESPIRATION RATE: 18 BRPM | BODY MASS INDEX: 32.15 KG/M2 | HEIGHT: 65 IN | OXYGEN SATURATION: 98 %

## 2017-09-19 DIAGNOSIS — Z12.5 PROSTATE CANCER SCREENING: ICD-10-CM

## 2017-09-19 DIAGNOSIS — R73.03 PREDIABETES: ICD-10-CM

## 2017-09-19 DIAGNOSIS — E78.00 HYPERCHOLESTEROLEMIA: Primary | ICD-10-CM

## 2017-09-19 DIAGNOSIS — R73.01 IMPAIRED FASTING GLUCOSE: ICD-10-CM

## 2017-09-19 DIAGNOSIS — I10 BENIGN ESSENTIAL HYPERTENSION: ICD-10-CM

## 2017-09-19 PROCEDURE — 80053 COMPREHEN METABOLIC PANEL: CPT

## 2017-09-19 PROCEDURE — 82043 UR ALBUMIN QUANTITATIVE: CPT

## 2017-09-19 PROCEDURE — 81001 URINALYSIS AUTO W/SCOPE: CPT

## 2017-09-19 PROCEDURE — 36415 COLL VENOUS BLD VENIPUNCTURE: CPT

## 2017-09-19 PROCEDURE — 84443 ASSAY THYROID STIM HORMONE: CPT

## 2017-09-19 PROCEDURE — 80061 LIPID PANEL: CPT

## 2017-09-19 PROCEDURE — 84153 ASSAY OF PSA TOTAL: CPT

## 2017-09-19 PROCEDURE — 85025 COMPLETE CBC W/AUTO DIFF WBC: CPT

## 2017-09-19 PROCEDURE — 83036 HEMOGLOBIN GLYCOSYLATED A1C: CPT

## 2017-09-19 NOTE — PROGRESS NOTES
Establish Care       HPI:  Charissa Liu is a 75y.o. year old male who is here to establish care. He  had his medical care:    ADM    He reports the following history and medical concerns:        Cholesterol- on lipitor 10 mg. Been on for many years. Dr. Alpesh Wilburn     HTN- lisinopril 10 mg.       Has a cold now. Started 2 days ago. Taking zyrtec. Feel a little better. Yellow sputum. No fever or chills. BP is up today because he feels sick. Normally ok in office.         Takes Baby aspirin 81 mg    Was told elevated PSA in the past and seen urologist.        Assessment and Plan        1. Hypercholesterolemia  The nature of cardiac risk has been fully discussed with this patient. I have made him aware of his LDL target goal given his cardiovascular risk analysis. I have discussed the appropriate diet. The need for lifelong compliance in order to reduce risk is stressed. A regular exercise program is recommended to help achieve and maintain normal body weight, fitness and improve lipid balance. The risks and benefits of medications were discussed. Last cholesterol labs reviewed with patient. Patient made aware to get liver checked every 6 months. Continue with  lipitor 10 mg.   - LIPID PANEL  - TSH REFLEX TO T4    2. Benign essential hypertension  Tolerating medication. Denies dizziness that is positional, SOB, or chest pain. Understands the importance of compliance to reduce risk of future heart failure. Agreed to call if any of above symptoms develop and  stay on current regimen of  Lisinopril.   - CBC WITH AUTOMATED DIFF  - LIPID PANEL  - METABOLIC PANEL, COMPREHENSIVE  - UA/M W/RFLX CULTURE, ROUTINE  - MICROALBUMIN, UR, RAND W/ MICROALBUMIN/CREA RATIO    3. Prediabetes  Increase resistance training. Reduce carbs at night.   - HEMOGLOBIN A1C WITH EAG    4. Prostate cancer screening  Pt wants PSA test.  Deferred rectal to physical scheduled next year. - PSA SCREENING (SCREENING)    5.  URI-  Doing better. Use saline steam to get out congestion. Likely viral.  Cough is on and off. PND on exam.          Visit Vitals    /90 (BP 1 Location: Left arm, BP Patient Position: Sitting)    Pulse 76    Temp 98.7 °F (37.1 °C) (Oral)    Resp 18    Ht 5' 4.5\" (1.638 m)    Wt 193 lb (87.5 kg)    SpO2 98%    BMI 32.62 kg/m2       Historical Data    Past Medical History:   Diagnosis Date    Benign essential hypertension     History of elevated PSA     2.4 (11/2012), 3.6 (1/2014), 2.1 (3/2015) evaluated by Dr. Shama Lipscomb, repeat PSA 1.9 (2/21/14); 2.1 (3/16/15), 2.4 (3/11/16), 2.7 (9/2016) - referred back to Dr. Jojo Staley Hypercholesterolemia     Incomplete RBBB 1/10/14    Obesity     Prediabetes 5/13/14       Past Surgical History:   Procedure Laterality Date    COLONOSCOPY N/A 11/16/2016    COLONOSCOPY performed by Bernice Hernández MD at [address] HX COLONOSCOPY  11/17/16    Dr. Arturo Garcia Prescriptions as of 9/19/2017   Medication Sig Dispense Refill    atorvastatin (LIPITOR) 10 mg tablet TAKE 1 TABLET BY MOUTH EVERY NIGHT AT BEDTIME 90 Tab 3    lisinopril (PRINIVIL, ZESTRIL) 10 mg tablet TAKE 1 TABLET BY MOUTH DAILY 90 Tab 2    ASPIRIN PO Take 81 mg by mouth daily.  psyllium seed-sucrose (METAMUCIL) powd Take  by mouth. Use twice daily       No facility-administered encounter medications on file as of 9/19/2017. No Known Allergies     Social History     Social History    Marital status:      Spouse name: N/A    Number of children: N/A    Years of education: N/A     Occupational History    Not on file.      Social History Main Topics    Smoking status: Never Smoker    Smokeless tobacco: Never Used    Alcohol use 0.0 oz/week     0 Standard drinks or equivalent per week      Comment: rarely    Drug use: No    Sexual activity: Yes     Partners: Female     Other Topics Concern    Not on file     Social History Narrative        family history includes Cancer in his brother and father; Diabetes in his mother; No Known Problems in his son; Stroke in his mother. Review of Systems   Constitutional: Negative for chills, diaphoresis, fever, malaise/fatigue and weight loss. HENT: Positive for congestion. Negative for ear discharge, ear pain and sore throat. Eyes: Negative for blurred vision. Respiratory: Positive for cough. Negative for shortness of breath and wheezing. Cardiovascular: Negative for chest pain and palpitations. Gastrointestinal: Negative for abdominal pain, nausea and vomiting. Genitourinary: Negative. Musculoskeletal: Negative. Negative for myalgias. Skin: Negative for itching and rash. Neurological: Negative for dizziness, focal weakness, weakness and headaches. Endo/Heme/Allergies: Negative for polydipsia. Physical Exam   Constitutional: He appears well-developed and well-nourished. He is active. Non-toxic appearance. He does not have a sickly appearance. He does not appear ill. No distress. HENT:   Mouth/Throat: Oropharyngeal exudate present. Eyes: Conjunctivae are normal. Right eye exhibits no discharge. Cardiovascular: Normal rate, regular rhythm, S1 normal, S2 normal, normal heart sounds and normal pulses. Exam reveals no gallop and no friction rub. Pulmonary/Chest: Effort normal and breath sounds normal. No respiratory distress. Abdominal: Soft. Bowel sounds are normal.   Musculoskeletal: He exhibits no edema or deformity. Neurological: He is alert. Skin: Skin is warm and dry. No rash noted. No pallor. Psychiatric: He has a normal mood and affect. His behavior is normal.   Vitals reviewed.      Ortho Exam       Orders Placed This Encounter    CBC WITH AUTOMATED DIFF    LIPID PANEL    TSH REFLEX TO T4    METABOLIC PANEL, COMPREHENSIVE    PSA SCREENING (SCREENING)    HEMOGLOBIN A1C WITH EAG    UA/M W/RFLX CULTURE, ROUTINE    MICROALBUMIN, UR, RAND W/ MICROALBUMIN/CREA RATIO I have reviewed the patient's medical history in detail and updated the computerized patient record. We had a prolonged discussion about these complex clinical issues and went over the various important aspects to consider. All questions were answered. Advised him to call back or return to office if symptoms do not improve, change in nature, or persist.    He was given an after visit summary or informed of [facility] Access which includes patient instructions, diagnoses, current medications, & vitals. He expressed understanding with the diagnosis and plan.

## 2017-09-19 NOTE — PATIENT INSTRUCTIONS
Instruction on how to use steam to improve congestion    Put two tbs of baking soda in a pot (quart) of water and heat to steam.  Take off fire and place face over steam with towel over your head and pot. Allow congestion to come out of nasal passages and then come out of towel to blow your nose. Return into the steam tent. It also will help more effectively to put a few drops of peppermint oil or eucalyptus in the mixture. They key is to allow everything stuck in your sinuses and nose to come out so it is not a medium for infection.

## 2017-09-19 NOTE — MR AVS SNAPSHOT
Visit Information Date & Time Provider Department Dept. Phone Encounter #  
 9/19/2017  9:45 AM Dacia Heck MD [place] [id number] [facility]  Follow-up Instructions Return in about 6 months (around 3/19/2018) for 30 min slot, MWE. Upcoming Health Maintenance Date Due INFLUENZA AGE 9 TO ADULT 8/1/2017 GLAUCOMA SCREENING Q2Y 3/1/2018* Pneumococcal 65+ Low/Medium Risk (2 of 2 - PPSV23) 3/8/2018 MEDICARE YEARLY EXAM 3/9/2018 DTaP/Tdap/Td series (2 - Td) 1/10/2024 COLONOSCOPY 11/16/2026 *Topic was postponed. The date shown is not the original due date. Allergies as of 9/19/2017  Review Complete On: 9/19/2017 By: Dacia Heck MD  
 No Known Allergies Current Immunizations  Never Reviewed Name Date Influenza High Dose Vaccine PF 9/14/2016 11:40 AM  
 Influenza Vaccine 12/1/2015, 12/1/2013 Pneumococcal Conjugate (PCV-13) 3/8/2017 Tdap 1/10/2014  1:07 PM  
 Zoster Vaccine, Live 12/1/2014 Not reviewed this visit You Were Diagnosed With   
  
 Codes Comments Hypercholesterolemia    -  Primary ICD-10-CM: E78.00 ICD-9-CM: 272.0 Benign essential hypertension     ICD-10-CM: I10 
ICD-9-CM: 401.1 Prediabetes     ICD-10-CM: R73.03 
ICD-9-CM: 790.29 Prostate cancer screening     ICD-10-CM: Z12.5 ICD-9-CM: V76.44 Impaired fasting glucose     ICD-10-CM: R73.01 
ICD-9-CM: 790.21 Vitals BP Pulse Temp Resp Height(growth percentile) Weight(growth percentile) 160/90 (BP 1 Location: Left arm, BP Patient Position: Sitting) 76 98.7 °F (37.1 °C) (Oral) 18 5' 4.5\" (1.638 m) 193 lb (87.5 kg) SpO2 BMI Smoking Status 98% 32.62 kg/m2 Never Smoker Vitals History BMI and BSA Data Body Mass Index Body Surface Area  
 32.62 kg/m 2 2 m 2 Preferred Pharmacy Pharmacy Name Phone  [id number]- [place], [address] [facility] [phone] Your Updated Medication List  
  
   
This list is accurate as of: 9/19/17 10:13 AM.  Always use your most recent med list.  
  
  
  
  
 ASPIRIN PO Take 81 mg by mouth daily. atorvastatin 10 mg tablet Commonly known as:  LIPITOR  
TAKE 1 TABLET BY MOUTH EVERY NIGHT AT BEDTIME  
  
 lisinopril 10 mg tablet Commonly known as:  PRINIVIL, ZESTRIL  
TAKE 1 TABLET BY MOUTH DAILY METAMUCIL (SUGAR) Powd Generic drug:  psyllium seed-sucrose Take  by mouth. Use twice daily We Performed the Following CBC WITH AUTOMATED DIFF [19832 CPT(R)] HEMOGLOBIN A1C WITH EAG [20540 CPT(R)] LIPID PANEL [86789 CPT(R)] METABOLIC PANEL, COMPREHENSIVE [49440 CPT(R)] MICROALBUMIN, UR, RAND W/ MICROALBUMIN/CREA RATIO [id number] CPT(R)] PSA SCREENING (SCREENING) [ HCP] TSH REFLEX TO T4 [[id number] Custom] UA/M W/RFLX CULTURE, ROUTINE [[id number] Custom] Follow-up Instructions Return in about 6 months (around 3/19/2018) for 30 min slot, MWE. Patient Instructions Instruction on how to use steam to improve congestion Put two tbs of baking soda in a pot (quart) of water and heat to steam.  Take off fire and place face over steam with towel over your head and pot. Allow congestion to come out of nasal passages and then come out of towel to blow your nose. Return into the steam tent. It also will help more effectively to put a few drops of peppermint oil or eucalyptus in the mixture. They key is to allow everything stuck in your sinuses and nose to come out so it is not a medium for infection. Introducing [facility]! [employer] introduces [email or web address] patient portal. Now you can access parts of your medical record, email your doctor's office, and request medication refills online. 1. In your internet browser, go to [email or web address] 2. Click on the First Time User? Click Here link in the Sign In box. You will see the New Member Sign Up page. 3. Enter your Gynzy Access Code exactly as it appears below. You will not need to use this code after youve completed the sign-up process. If you do not sign up before the expiration date, you must request a new code. · Gynzy Access Code: [email or web address] Expires: 12/18/2017 10:13 AM 
 
4. Enter the last four digits of your Social Security Number (xxxx) and Date of Birth (mm/dd/yyyy) as indicated and click Submit. You will be taken to the next sign-up page. 5. Create a Gynzy ID. This will be your Gynzy login ID and cannot be changed, so think of one that is secure and easy to remember. 6. Create a Gynzy password. You can change your password at any time. 7. Enter your Password Reset Question and Answer. This can be used at a later time if you forget your password. 8. Enter your e-mail address. You will receive e-mail notification when new information is available in [address] 9. Click Sign Up. You can now view and download portions of your medical record. 10. Click the Download Summary menu link to download a portable copy of your medical information. If you have questions, please visit the Frequently Asked Questions section of the G[employer]zy website. Remember, G[employer]zy is NOT to be used for urgent needs. For medical emergencies, dial 911. Now available from your iPhone and Android! Please provide this summary of care documentation to your next provider. Your primary care clinician is listed as Tito Raza. If you have any questions after today's visit, please call [phone].

## 2017-09-19 NOTE — PROGRESS NOTES
Chief Complaint   Patient presents with   [address] [place] [address]        1. Have you been to the ER, urgent care clinic since your last visit? No  Hospitalized since your last visit? No    2. Have you seen or consulted any other health care providers outside of the [address] [place] [address] since your last visit? No  Include any pap smears or colon screening.  No

## 2017-09-22 LAB
ALBUMIN SERPL-MCNC: 4.7 G/DL (ref 3.6–4.8)
ALBUMIN/GLOB SERPL: 1.7 {RATIO} (ref 1.2–2.2)
ALP SERPL-CCNC: 77 IU/L (ref 39–117)
ALT SERPL-CCNC: 22 IU/L (ref 0–44)
AST SERPL-CCNC: 20 IU/L (ref 0–40)
BASOPHILS # BLD AUTO: 0.1 X10E3/UL (ref 0–0.2)
BASOPHILS NFR BLD AUTO: 1 %
BILIRUB SERPL-MCNC: 0.5 MG/DL (ref 0–1.2)
BUN SERPL-MCNC: 13 MG/DL (ref 8–27)
BUN/CREAT SERPL: 14 (ref 10–24)
CALCIUM SERPL-MCNC: 9.1 MG/DL (ref 8.6–10.2)
CHLORIDE SERPL-SCNC: 98 MMOL/L (ref 96–106)
CHOLEST SERPL-MCNC: 169 MG/DL (ref 100–199)
CO2 SERPL-SCNC: 25 MMOL/L (ref 18–29)
CREAT SERPL-MCNC: 0.94 MG/DL (ref 0.76–1.27)
CREAT UR-MCNC: NORMAL MG/DL
EOSINOPHIL # BLD AUTO: 0.2 X10E3/UL (ref 0–0.4)
EOSINOPHIL NFR BLD AUTO: 3 %
ERYTHROCYTE [DISTWIDTH] IN BLOOD BY AUTOMATED COUNT: 13.3 % (ref 12.3–15.4)
EST. AVERAGE GLUCOSE BLD GHB EST-MCNC: 120 MG/DL
GLOBULIN SER CALC-MCNC: 2.8 G/DL (ref 1.5–4.5)
GLUCOSE SERPL-MCNC: 96 MG/DL (ref 65–99)
HBA1C MFR BLD: 5.8 % (ref 4.8–5.6)
HCT VFR BLD AUTO: 42.1 % (ref 37.5–51)
HDLC SERPL-MCNC: 41 MG/DL
HGB BLD-MCNC: 14.1 G/DL (ref 12.6–17.7)
IMM GRANULOCYTES # BLD: 0 X10E3/UL (ref 0–0.1)
IMM GRANULOCYTES NFR BLD: 0 %
INTERPRETATION, 910389: NORMAL
LDLC SERPL CALC-MCNC: 90 MG/DL (ref 0–99)
LYMPHOCYTES # BLD AUTO: 1.1 X10E3/UL (ref 0.7–3.1)
LYMPHOCYTES NFR BLD AUTO: 15 %
MCH RBC QN AUTO: 30.7 PG (ref 26.6–33)
MCHC RBC AUTO-ENTMCNC: 33.5 G/DL (ref 31.5–35.7)
MCV RBC AUTO: 92 FL (ref 79–97)
MONOCYTES # BLD AUTO: 0.6 X10E3/UL (ref 0.1–0.9)
MONOCYTES NFR BLD AUTO: 8 %
NEUTROPHILS # BLD AUTO: 5.3 X10E3/UL (ref 1.4–7)
NEUTROPHILS NFR BLD AUTO: 73 %
PLATELET # BLD AUTO: 300 X10E3/UL (ref 150–379)
POTASSIUM SERPL-SCNC: 4.3 MMOL/L (ref 3.5–5.2)
PROT SERPL-MCNC: 7.5 G/DL (ref 6–8.5)
PSA SERPL-MCNC: 2.4 NG/ML (ref 0–4)
RBC # BLD AUTO: 4.59 X10E6/UL (ref 4.14–5.8)
SODIUM SERPL-SCNC: 138 MMOL/L (ref 134–144)
SP GR UR: NORMAL
TRIGL SERPL-MCNC: 192 MG/DL (ref 0–149)
TSH SERPL DL<=0.005 MIU/L-ACNC: 1.13 UIU/ML (ref 0.45–4.5)
VLDLC SERPL CALC-MCNC: 38 MG/DL (ref 5–40)
WBC # BLD AUTO: 7.3 X10E3/UL (ref 3.4–10.8)

## 2017-09-26 NOTE — PROGRESS NOTES
Patient notified blood test looked very good. Increase in triglycerides. Watch carb intake. Patient would like a copy of lab results.

## 2017-11-09 NOTE — TELEPHONE ENCOUNTER
Last office visit- 9/19/17  Next office visit- 3/20/18  Last Refill- 7/18/17      Requested Prescriptions     Pending Prescriptions Disp Refills    lisinopril (PRINIVIL, ZESTRIL) 10 mg tablet 90 Tab 2

## 2017-11-10 RX ORDER — LISINOPRIL 10 MG/1
TABLET ORAL
Qty: 90 TAB | Refills: 3 | Status: SHIPPED | OUTPATIENT
Start: 2017-11-10 | End: 2018-10-31 | Stop reason: SDUPTHER

## 2018-03-01 ENCOUNTER — HOSPITAL ENCOUNTER (OUTPATIENT)
Dept: LAB | Age: 68
Discharge: HOME | End: 2018-03-01
Payer: MEDICARE

## 2018-03-01 PROCEDURE — 36415 COLL VENOUS BLD VENIPUNCTURE: CPT

## 2018-03-01 PROCEDURE — 80061 LIPID PANEL: CPT

## 2018-03-01 PROCEDURE — 80053 COMPREHEN METABOLIC PANEL: CPT

## 2018-03-01 PROCEDURE — 85025 COMPLETE CBC W/AUTO DIFF WBC: CPT

## 2018-03-02 LAB
ALBUMIN SERPL-MCNC: 4.6 G/DL (ref 3.6–4.8)
ALBUMIN/GLOB SERPL: 1.6 {RATIO} (ref 1.2–2.2)
ALP SERPL-CCNC: 71 IU/L (ref 39–117)
ALT SERPL-CCNC: 23 IU/L (ref 0–44)
AST SERPL-CCNC: 23 IU/L (ref 0–40)
BASOPHILS # BLD AUTO: 0 X10E3/UL (ref 0–0.2)
BASOPHILS NFR BLD AUTO: 0 %
BILIRUB SERPL-MCNC: 0.5 MG/DL (ref 0–1.2)
BUN SERPL-MCNC: 12 MG/DL (ref 8–27)
BUN/CREAT SERPL: 11 (ref 10–24)
CALCIUM SERPL-MCNC: 9.2 MG/DL (ref 8.6–10.2)
CHLORIDE SERPL-SCNC: 100 MMOL/L (ref 96–106)
CHOLEST SERPL-MCNC: 164 MG/DL (ref 100–199)
CO2 SERPL-SCNC: 25 MMOL/L (ref 18–29)
CREAT SERPL-MCNC: 1.08 MG/DL (ref 0.76–1.27)
EOSINOPHIL # BLD AUTO: 0.3 X10E3/UL (ref 0–0.4)
EOSINOPHIL NFR BLD AUTO: 4 %
ERYTHROCYTE [DISTWIDTH] IN BLOOD BY AUTOMATED COUNT: 13.4 % (ref 12.3–15.4)
GFR SERPLBLD CREATININE-BSD FMLA CKD-EPI: 70 ML/MIN/1.73
GFR SERPLBLD CREATININE-BSD FMLA CKD-EPI: 81 ML/MIN/1.73
GLOBULIN SER CALC-MCNC: 2.8 G/DL (ref 1.5–4.5)
GLUCOSE SERPL-MCNC: 102 MG/DL (ref 65–99)
HCT VFR BLD AUTO: 42.3 % (ref 37.5–51)
HDLC SERPL-MCNC: 43 MG/DL
HGB BLD-MCNC: 14.2 G/DL (ref 13–17.7)
IMM GRANULOCYTES # BLD: 0 X10E3/UL (ref 0–0.1)
IMM GRANULOCYTES NFR BLD: 0 %
INTERPRETATION, 910389: NORMAL
LDLC SERPL CALC-MCNC: 93 MG/DL (ref 0–99)
LYMPHOCYTES # BLD AUTO: 1.4 X10E3/UL (ref 0.7–3.1)
LYMPHOCYTES NFR BLD AUTO: 21 %
MCH RBC QN AUTO: 30.2 PG (ref 26.6–33)
MCHC RBC AUTO-ENTMCNC: 33.6 G/DL (ref 31.5–35.7)
MCV RBC AUTO: 90 FL (ref 79–97)
MONOCYTES # BLD AUTO: 0.3 X10E3/UL (ref 0.1–0.9)
MONOCYTES NFR BLD AUTO: 5 %
NEUTROPHILS # BLD AUTO: 4.8 X10E3/UL (ref 1.4–7)
NEUTROPHILS NFR BLD AUTO: 70 %
PLATELET # BLD AUTO: 316 X10E3/UL (ref 150–379)
POTASSIUM SERPL-SCNC: 4.8 MMOL/L (ref 3.5–5.2)
PROT SERPL-MCNC: 7.4 G/DL (ref 6–8.5)
RBC # BLD AUTO: 4.7 X10E6/UL (ref 4.14–5.8)
SODIUM SERPL-SCNC: 139 MMOL/L (ref 134–144)
TRIGL SERPL-MCNC: 142 MG/DL (ref 0–149)
VLDLC SERPL CALC-MCNC: 28 MG/DL (ref 5–40)
WBC # BLD AUTO: 6.8 X10E3/UL (ref 3.4–10.8)

## 2018-03-20 ENCOUNTER — OFFICE VISIT (OUTPATIENT)
Dept: INTERNAL MEDICINE CLINIC | Age: 68
End: 2018-03-20

## 2018-03-20 VITALS
HEIGHT: 65 IN | DIASTOLIC BLOOD PRESSURE: 70 MMHG | RESPIRATION RATE: 15 BRPM | SYSTOLIC BLOOD PRESSURE: 120 MMHG | BODY MASS INDEX: 32.59 KG/M2 | WEIGHT: 195.6 LBS | OXYGEN SATURATION: 95 % | TEMPERATURE: 99.6 F | HEART RATE: 75 BPM

## 2018-03-20 DIAGNOSIS — R35.1 NOCTURIA: ICD-10-CM

## 2018-03-20 DIAGNOSIS — I10 BENIGN ESSENTIAL HYPERTENSION: ICD-10-CM

## 2018-03-20 DIAGNOSIS — Z13.6 SCREENING FOR ABDOMINAL AORTIC ANEURYSM: ICD-10-CM

## 2018-03-20 DIAGNOSIS — R73.03 PREDIABETES: ICD-10-CM

## 2018-03-20 DIAGNOSIS — Z00.00 MEDICARE ANNUAL WELLNESS VISIT, SUBSEQUENT: ICD-10-CM

## 2018-03-20 DIAGNOSIS — E78.00 HYPERCHOLESTEROLEMIA: Primary | ICD-10-CM

## 2018-03-20 DIAGNOSIS — Z13.31 SCREENING FOR DEPRESSION: ICD-10-CM

## 2018-03-20 NOTE — PROGRESS NOTES
Annual Wellness Visit       HPI:  Zay Rojas is a 75y.o. year old male who is here for a follow up visit. He was last seen by me on 3/1/2018. He reports the following:    Doing well. Worked out this am.    2-3 times a week. Wt Readings from Last 3 Encounters:   03/20/18 195 lb 9.6 oz (88.7 kg)   09/19/17 193 lb (87.5 kg)   03/08/17 192 lb (87.1 kg)        Hypertension    Patient has a history of HTN. The patient is taking hypertensive medications compliantly without side effects. Denies chest pain, dyspnea, edema, or symptoms of TIA's. No cough    BP Readings from Last 3 Encounters:   03/20/18 120/70   09/19/17 160/90   03/08/17 118/72             Visit Vitals    /70 (BP 1 Location: Right arm, BP Patient Position: Sitting)    Pulse 75    Temp 99.6 °F (37.6 °C) (Oral)    Resp 15    Ht 5' 4.5\" (1.638 m)    Wt 195 lb 9.6 oz (88.7 kg)    SpO2 95%    BMI 33.06 kg/m2       Assessment and Plan        1. Hypercholesterolemia  The nature of cardiac risk has been fully discussed with this patient. I have made him aware of his LDL target goal given his cardiovascular risk analysis. I have discussed the appropriate diet. The need for lifelong compliance in order to reduce risk is stressed. A regular exercise program is recommended to help achieve and maintain normal body weight, fitness and improve lipid balance. The risks and benefits of medications were discussed. Last cholesterol labs reviewed with patient. Patient made aware to get liver checked every 6 months. Continue with  lipitor    2. Prediabetes  Glycemic handout discussed and addressed again or given to patient in print out. Preventing Diabetes or improving a1c cannot be done just by foods, but regular exercise and weight loss- at least 150 minutes of exercise involving resistance training and cardio.   No Carbs at night is ideal.   Talked about doing more than elliptical.     3. Benign essential hypertension  Tolerating medication. Denies dizziness that is positional, SOB, or chest pain. Understands the importance of compliance to reduce risk of future heart failure. Agreed to call if any of above symptoms develop and  stay on current regimen of    Key CAD CHF Meds             lisinopril (PRINIVIL, ZESTRIL) 10 mg tablet  (Taking) TAKE 1 TABLET BY MOUTH DAILY    atorvastatin (LIPITOR) 10 mg tablet  (Taking) TAKE 1 TABLET BY MOUTH EVERY NIGHT AT BEDTIME    ASPIRIN PO  (Taking) Take 81 mg by mouth daily. no cough. 4. Screening for abdominal aortic aneurysm  Patient aware the above test was ordered and should call central scheduling or our office if no one contacts them. Discussed the importance and reason for the test -   - US EXAM SCREENING AAA; Future    5. Screening for depression  PHQ over the last two weeks 3/20/2018   Little interest or pleasure in doing things Not at all   Feeling down, depressed or hopeless Not at all   Total Score PHQ 2 0        6. Medicare annual wellness visit, subsequent  Gave more info on AD. Wife is HCP.  reviewed.           Historical Data    Past Medical History:   Diagnosis Date    Benign essential hypertension     History of elevated PSA     2.4 (11/2012), 3.6 (1/2014), 2.1 (3/2015) evaluated by Dr. Maria Esther Olmos, repeat PSA 1.9 (2/21/14); 2.1 (3/16/15), 2.4 (3/11/16), 2.7 (9/2016) - referred back to Dr. Mateus Phelan Hypercholesterolemia     Incomplete RBBB 1/10/14    Obesity     Prediabetes 5/13/14       Past Surgical History:   Procedure Laterality Date    COLONOSCOPY N/A 11/16/2016    COLONOSCOPY performed by Jourdan Riley MD at [address] HX COLONOSCOPY  11/17/16    Dr. Ritu Leija Prescriptions as of 3/20/2018   Medication Sig Dispense Refill    lisinopril (PRINIVIL, ZESTRIL) 10 mg tablet TAKE 1 TABLET BY MOUTH DAILY 90 Tab 3    atorvastatin (LIPITOR) 10 mg tablet TAKE 1 TABLET BY MOUTH EVERY NIGHT AT BEDTIME 90 Tab 3    ASPIRIN PO Take 81 mg by mouth daily.  psyllium seed-sucrose (METAMUCIL) powd Take  by mouth. Use twice daily       No facility-administered encounter medications on file as of 3/20/2018. No Known Allergies     Social History     Social History    Marital status:      Spouse name: N/A    Number of children: N/A    Years of education: N/A     Occupational History    Not on file. Social History Main Topics    Smoking status: Never Smoker    Smokeless tobacco: Never Used    Alcohol use 0.0 oz/week     0 Standard drinks or equivalent per week      Comment: rarely    Drug use: No    Sexual activity: Yes     Partners: Female     Other Topics Concern    Not on file     Social History Narrative        family history includes Cancer in his brother and father; Diabetes in his mother; No Known Problems in his son; Stroke in his mother. Review of Systems   Constitutional: Negative for weight loss. Eyes: Negative for blurred vision. Respiratory: Negative for shortness of breath. Cardiovascular: Negative for chest pain. Gastrointestinal: Negative for abdominal pain. Genitourinary: Negative for dysuria and frequency. Skin: Negative for rash. Neurological: Negative for dizziness, focal weakness, weakness and headaches. Endo/Heme/Allergies: Negative for environmental allergies. Does not bruise/bleed easily. Physical Exam   Constitutional: He appears well-developed and well-nourished. He is active. Non-toxic appearance. He does not have a sickly appearance. He does not appear ill. No distress. HENT:   Right Ear: External ear normal.   Eyes: Conjunctivae are normal. Right eye exhibits no discharge. Cardiovascular: Normal rate, regular rhythm, S1 normal, S2 normal, normal heart sounds and normal pulses. Exam reveals no gallop and no friction rub. Pulmonary/Chest: Effort normal and breath sounds normal. No respiratory distress. Abdominal: Soft.  Bowel sounds are normal. Musculoskeletal: He exhibits no edema or deformity. Neurological: He is alert. Skin: Skin is warm and dry. No rash noted. No pallor. Psychiatric: He has a normal mood and affect. His behavior is normal.   Vitals reviewed. Ortho Exam      Orders Placed This Encounter    US EXAM SCREENING AAA     Standing Status:   Future     Standing Expiration Date:   4/20/2019     Order Specific Question:   Reason for Exam     Answer:   screening        I have reviewed the patient's medical history in detail and updated the computerized patient record. We had a prolonged discussion about these complex clinical issues and went over the various important aspects to consider. All questions were answered. Advised him to call back or return to office if symptoms do not improve, change in nature, or persist.    He was given an after visit summary or informed of [employer] Access which includes patient instructions, diagnoses, current medications, & vitals. He expressed understanding with the diagnosis and plan. Don Aguilar is a 75 y.o. male and presents for annual Medicare Wellness Visit. Assessment of cognitive impairment: Alert and oriented x 3. Patient denies concerns about cognitive impairment. Problem List: Reviewed with patient and discussed risk factors. Patient Active Problem List   Diagnosis Code    Incomplete RBBB I45.10    Hypercholesterolemia E78.00    Benign essential hypertension I10    Prediabetes R73.03    Obesity E66.9    History of elevated PSA O39.869    Advanced care planning/counseling discussion Z71.89       Current medical providers:  Patient Care Team:  Jossy Warren MD as PCP - General (Internal Medicine)        End of Life Planning: This was discussed with him today and he has an advanced directive - a copy HAS NOT been provided. Reviewed DNR/DNI and patient is no. Depression Screen: Reviewed PQH2 done by nurse.   PHQ over the last two weeks 3/20/2018 Little interest or pleasure in doing things Not at all   Feeling down, depressed or hopeless Not at all   Total Score PHQ 2 0       Fall Risk:   Fall Risk Assessment, last 12 mths 3/20/2018   Able to walk? Yes   Fall in past 12 months? No   Fall with injury? -   Number of falls in past 12 months -   Fall Risk Score -       Home Safety - discussion completed. No issues found. Hearing Loss:  Hearing is good. Activities of Daily Living:  Self-care. Requires assistance with: no ADLs    Adult Nutrition Screen:  No risk factors noted. Health Maintenance- Reviewed    AAA Screening: ordered. Glaucoma Screenin-3 years ago. Health Maintenance Due   Topic Date Due    GLAUCOMA SCREENING Q2Y  2017        Health Maintenance reviewed -  Plan:      Orders Placed This Encounter    US EXAM SCREENING AAA           Plan:    Diagnoses and all orders for this visit:    1. Hypercholesterolemia    2. Prediabetes    3. Benign essential hypertension    4. Screening for abdominal aortic aneurysm  -     US EXAM SCREENING AAA; Future    5. Screening for depression    6. Medicare annual wellness visit, subsequent    Orders Placed This Encounter    US EXAM SCREENING AAA         Follow-up Disposition: Not on File  Reviewed with patient the treatment plan, goals of treatment plan, and limitations of treatment plan, to include the potential for side effects from medications and procedures. If side effects occur, it is the responsibility of the patient to inform the clinic so that a change in the treatment plan can be made in a safe manner. The patient is advised that stopping prescribed medication may cause an increase in symptoms and possible medication withdrawal symptoms. The patient is informed an emergency room evaluation may be necessary if this occurs. Patient verbalized understanding and is in agreement with treatment plan as outlined above. All questions answered.

## 2018-03-20 NOTE — PROGRESS NOTES
Chief Complaint   Patient presents with   Nimco Ortiz Annual Wellness Visit     1. Have you been to the ER, urgent care clinic since your last visit? Hospitalized since your last visit? No    2. Have you seen or consulted any other health care providers outside of the [address] [place] [address] since your last visit? Include any pap smears or colon screening.  No     Health Maintenance Due   Topic Date Due    GLAUCOMA SCREENING Q2Y  01/30/2017    Pneumococcal 65+ Low/Medium Risk (2 of 2 - PPSV23) 03/08/2018

## 2018-03-20 NOTE — MR AVS SNAPSHOT
[address], [address] [facility] 
[phone] Patient: Tha Easley MRN: [id number] Visit Information Date & Time Provider Department Dept. Phone Encounter #  
 3/20/2018  9:15 AM Yesi Haas MD [place] [id number] [facility] [phone] Follow-up Instructions Return in about 6 months (around 9/20/2018) for Follow up 30 min slot. Upcoming Health Maintenance Date Due  
 GLAUCOMA SCREENING Q2Y 1/30/2017 MEDICARE YEARLY EXAM 3/21/2019 DTaP/Tdap/Td series (2 - Td) 1/10/2024 COLONOSCOPY 11/16/2026 Allergies as of 3/20/2018  Review Complete On: 3/20/2018 By: Yesi Haas MD  
 No Known Allergies Current Immunizations  Never Reviewed Name Date Influenza High Dose Vaccine PF 9/14/2016 11:40 AM  
 Influenza Vaccine 12/1/2015, 12/1/2013 Pneumococcal Conjugate (PCV-13) 3/8/2017 Tdap 1/10/2014  1:07 PM  
 Zoster Vaccine, Live 12/1/2014 Not reviewed this visit You Were Diagnosed With   
  
 Codes Comments Hypercholesterolemia    -  Primary ICD-10-CM: E78.00 ICD-9-CM: 272.0 Prediabetes     ICD-10-CM: R73.03 
ICD-9-CM: 790.29 Benign essential hypertension     ICD-10-CM: I10 
ICD-9-CM: 401.1 Screening for abdominal aortic aneurysm     ICD-10-CM: Z13.6 ICD-9-CM: V81.2 Screening for depression     ICD-10-CM: Z13.89 ICD-9-CM: V79.0 Medicare annual wellness visit, subsequent     ICD-10-CM: Z00.00 ICD-9-CM: V70.0 Vitals BP Pulse Temp Resp Height(growth percentile) Weight(growth percentile) 120/70 (BP 1 Location: Right arm, BP Patient Position: Sitting) 75 99.6 °F (37.6 °C) (Oral) 15 5' 4.5\" (1.638 m) 195 lb 9.6 oz (88.7 kg) SpO2 BMI Smoking Status 95% 33.06 kg/m2 Never Smoker Vitals History BMI and BSA Data Body Mass Index Body Surface Area 33.06 kg/m 2 2.01 m 2 Preferred Pharmacy Pharmacy Name Phone [id number] [address], [address] [phone] Your Updated Medication List  
  
   
This list is accurate as of 3/20/18  9:54 AM.  Always use your most recent med list.  
  
  
  
  
 ASPIRIN PO Take 81 mg by mouth daily. atorvastatin 10 mg tablet Commonly known as:  LIPITOR  
TAKE 1 TABLET BY MOUTH EVERY NIGHT AT BEDTIME  
  
 lisinopril 10 mg tablet Commonly known as:  PRINIVIL, ZESTRIL  
TAKE 1 TABLET BY MOUTH DAILY METAMUCIL (SUGAR) Powd Generic drug:  psyllium seed-sucrose Take  by mouth. Use twice daily Follow-up Instructions Return in about 6 months (around 9/20/2018) for Follow up 30 min slot. To-Do List   
 03/20/2018 Imaging:  US EXAM SCREENING AAA Patient Instructions Jayme Marquez (eye doctor) Introducing [facility]! José Luis Rob introduces [employer]Less patient portal. Now you can access parts of your medical record, email your doctor's office, and request medication refills online. 1. In your internet browser, go to [email or web address] 2. Click on the First Time User? Click Here link in the Sign In box. You will see the New Member Sign Up page. 3. Enter your 4Less Access Code exactly as it appears below. You will not need to use this code after youve completed the sign-up process. If you do not sign up before the expiration date, you must request a new code. · 4Less Access Code: [email or web address] Expires: 6/18/2018  9:54 AM 
 
4. Enter the last four digits of your Social Security Number (xxxx) and Date of Birth (mm/dd/yyyy) as indicated and click Submit. You will be taken to the next sign-up page. 5. Create a Fuzzt ID. This will be your 4Less login ID and cannot be changed, so think of one that is secure and easy to remember. 6. Create a Fuzzt password. You can change your password at any time. 7. Enter your Password Reset Question and Answer. This can be used at a later time if you forget your password. 8. Enter your e-mail address. You will receive e-mail notification when new information is available in [address] 9. Click Sign Up. You can now view and download portions of your medical record. 10. Click the Download Summary menu link to download a portable copy of your medical information. If you have questions, please visit the Frequently Asked Questions section of the [employer] website. Remember, [employer] is NOT to be used for urgent needs. For medical emergencies, dial 911. Now available from your iPhone and Android! Please provide this summary of care documentation to your next provider. Your primary care clinician is listed as Valentín Purvis. If you have any questions after today's visit, please call [phone].

## 2018-03-30 ENCOUNTER — HOSPITAL ENCOUNTER (OUTPATIENT)
Dept: ULTRASOUND IMAGING | Age: 68
Discharge: HOME | End: 2018-03-30
Attending: INTERNAL MEDICINE
Payer: MEDICARE

## 2018-03-30 DIAGNOSIS — Z13.6 SCREENING FOR ABDOMINAL AORTIC ANEURYSM: ICD-10-CM

## 2018-03-30 PROCEDURE — 76706 US ABDL AORTA SCREEN AAA: CPT

## 2018-04-03 ENCOUNTER — TELEPHONE (OUTPATIENT)
Dept: INTERNAL MEDICINE CLINIC | Age: 68
End: 2018-04-03

## 2018-04-03 NOTE — TELEPHONE ENCOUNTER
----- Message from Ricky Carlson MD sent at 3/30/2018 12:27 PM EDT -----  Let him no aneurysm found  Left message for patient to return a call about his abdominal ultrasound.

## 2018-07-02 RX ORDER — ATORVASTATIN CALCIUM 10 MG/1
TABLET, FILM COATED ORAL
Qty: 90 TAB | Refills: 3 | Status: SHIPPED | OUTPATIENT
Start: 2018-07-02 | End: 2019-03-21 | Stop reason: SDUPTHER

## 2018-08-24 ENCOUNTER — HOSPITAL ENCOUNTER (OUTPATIENT)
Dept: LAB | Age: 68
Discharge: HOME | End: 2018-08-24
Payer: MEDICARE

## 2018-08-24 PROCEDURE — 81001 URINALYSIS AUTO W/SCOPE: CPT

## 2018-08-24 PROCEDURE — 84154 ASSAY OF PSA FREE: CPT

## 2018-08-24 PROCEDURE — 83036 HEMOGLOBIN GLYCOSYLATED A1C: CPT

## 2018-08-24 PROCEDURE — 80053 COMPREHEN METABOLIC PANEL: CPT

## 2018-08-24 PROCEDURE — 80061 LIPID PANEL: CPT

## 2018-08-24 PROCEDURE — 85025 COMPLETE CBC W/AUTO DIFF WBC: CPT

## 2018-08-24 PROCEDURE — 82043 UR ALBUMIN QUANTITATIVE: CPT

## 2018-08-24 PROCEDURE — 36415 COLL VENOUS BLD VENIPUNCTURE: CPT

## 2018-09-19 ENCOUNTER — OFFICE VISIT (OUTPATIENT)
Dept: INTERNAL MEDICINE CLINIC | Age: 68
End: 2018-09-19

## 2018-09-19 VITALS
BODY MASS INDEX: 33.12 KG/M2 | WEIGHT: 198.8 LBS | TEMPERATURE: 98 F | RESPIRATION RATE: 18 BRPM | HEIGHT: 65 IN | DIASTOLIC BLOOD PRESSURE: 70 MMHG | OXYGEN SATURATION: 93 % | HEART RATE: 73 BPM | SYSTOLIC BLOOD PRESSURE: 126 MMHG

## 2018-09-19 DIAGNOSIS — E78.00 HYPERCHOLESTEROLEMIA: ICD-10-CM

## 2018-09-19 DIAGNOSIS — R73.03 PREDIABETES: ICD-10-CM

## 2018-09-19 DIAGNOSIS — R35.0 BENIGN PROSTATIC HYPERPLASIA WITH URINARY FREQUENCY: ICD-10-CM

## 2018-09-19 DIAGNOSIS — I10 BENIGN ESSENTIAL HYPERTENSION: ICD-10-CM

## 2018-09-19 DIAGNOSIS — E66.9 OBESITY WITHOUT SERIOUS COMORBIDITY, UNSPECIFIED CLASSIFICATION, UNSPECIFIED OBESITY TYPE: ICD-10-CM

## 2018-09-19 DIAGNOSIS — R73.01 IFG (IMPAIRED FASTING GLUCOSE): ICD-10-CM

## 2018-09-19 DIAGNOSIS — R97.20 INCREASED PROSTATE SPECIFIC ANTIGEN (PSA) VELOCITY: Primary | ICD-10-CM

## 2018-09-19 DIAGNOSIS — N40.1 BENIGN PROSTATIC HYPERPLASIA WITH URINARY FREQUENCY: ICD-10-CM

## 2018-09-19 RX ORDER — TAMSULOSIN HYDROCHLORIDE 0.4 MG/1
0.4 CAPSULE ORAL DAILY
Qty: 30 CAP | Refills: 5 | Status: SHIPPED | OUTPATIENT
Start: 2018-09-19 | End: 2019-03-28 | Stop reason: SDUPTHER

## 2018-09-19 NOTE — PROGRESS NOTES
Complete Physical       HPI:  Zaynab Villavicencio is a 70y.o. year old male who is here for a follow up visit. He was last seen by me on Visit date not found. He reports the following:    Twisted his right knee going down hill playing golf. No swelling or pain. Just limited range of motion. Sometimes limp. PSA went up from 2.4 to 3.4 - no symptoms. Father may have had prostate cancer. 2-3 times a night. Normal amount. No hesitancy. No urgency. hgA1C 6.1 from 5.9. Thinking about going on nutrasystem diet. Exercising less because of knee. Following low glycemic foods. Pt received flu shot a week ago. Hyperlipidemia    Patient has history of hyperlipidemia that is being managed with medications. He has been taking his statin cholesterol medication regularly without side effects such as myalgias or upper abdominal pain, nausea or jaundice. Lab Results   Component Value Date/Time    Cholesterol, total 175 08/24/2018 10:54 AM    HDL Cholesterol 45 08/24/2018 10:54 AM    LDL, calculated 94 08/24/2018 10:54 AM    VLDL, calculated 36 08/24/2018 10:54 AM    Triglyceride 180 (H) 08/24/2018 10:54 AM    CHOL/HDL Ratio 3.6 05/05/2010 11:34 AM             Assessment and Plan        1. Increased prostate specific antigen (PSA) velocity  Repeat PSA in 6 months. Slight increase of 1 pt over a year. Will recheck in 6 months. Some urine frequency  - PSA TOTAL (REFLEX TO FREE); Future    2. Benign essential hypertension  Tolerating medication. Denies dizziness that is positional, SOB, or chest pain. Understands the importance of compliance to reduce risk of future heart failure.    Agreed to call if any of above symptoms develop and  stay on current regimen of    Key CAD CHF Meds             atorvastatin (LIPITOR) 10 mg tablet  (Taking) TAKE 1 TABLET BY MOUTH EVERY NIGHT AT BEDTIME    lisinopril (PRINIVIL, ZESTRIL) 10 mg tablet  (Taking) TAKE 1 TABLET BY MOUTH DAILY    ASPIRIN PO  (Taking) Take 81 mg by mouth daily. no cough with lisinopril  - HEMOGLOBIN A1C WITH EAG; Future  - METABOLIC PANEL, COMPREHENSIVE; Future    3. Hypercholesterolemia  The nature of cardiac risk has been fully discussed with this patient. I have made him aware of his LDL target goal given his cardiovascular risk analysis. I have discussed the appropriate diet. The need for lifelong compliance in order to reduce risk is stressed. A regular exercise program is recommended to help achieve and maintain normal body weight, fitness and improve lipid balance. The risks and benefits of medications were discussed. Last cholesterol labs reviewed with patient. Patient made aware to get liver checked every 6 months. Continue with    Key Antihyperlipidemia Meds             atorvastatin (LIPITOR) 10 mg tablet  (Taking) TAKE 1 TABLET BY MOUTH EVERY NIGHT AT BEDTIME         - LIPID PANEL; Future    4. Obesity without serious comorbidity, unspecified classification, unspecified obesity type  Discussed with patient the importance of diet and exercise. Discussed reducing foods that promote inflammatory and introducing accountability in portion control and  diet, not to eat late at night, reduce carbohydrate intake at night. Exercise using high intensity interval training rather than 20 minutes of slow cardiac exercise to lose weight. Knee is preventing full work out      5. Prediabetes  Glycemic handout discussed and addressed again or given to patient in print out. Preventing Diabetes or improving a1c cannot be done just by foods, but regular exercise and weight loss- at least 150 minutes of exercise involving resistance training and cardio. No Carbs at night is ideal.     6. IFG (impaired fasting glucose)   Slight increase. Must do resistance training also  - HEMOGLOBIN A1C WITH EAG; Future    7.  Benign prostatic hyperplasia with urinary frequency  The risks and benefits of the new medication were discussed as well as possible side effects. Patient is instructed to call if any new symptoms arise that are listed in the AVS printed or available on MyChart or discussed:  Dizziness with standing.    - tamsulosin (FLOMAX) 0.4 mg capsule; Take 1 Cap by mouth daily for 30 days. Dispense: 30 Cap; Refill: 5            Visit Vitals    /70 (BP 1 Location: Left arm, BP Patient Position: Sitting)    Pulse 73    Temp 98 °F (36.7 °C) (Oral)    Resp 18    Ht 5' 4.5\" (1.638 m)    Wt 198 lb 12.8 oz (90.2 kg)    SpO2 93%    BMI 33.6 kg/m2       Historical Data    Past Medical History:   Diagnosis Date    Benign essential hypertension     History of elevated PSA     2.4 (11/2012), 3.6 (1/2014), 2.1 (3/2015) evaluated by Dr. Shobha Sanchez, repeat PSA 1.9 (2/21/14); 2.1 (3/16/15), 2.4 (3/11/16), 2.7 (9/2016) - referred back to Dr. Bunny Reyes Hypercholesterolemia     Incomplete RBBB 1/10/14    Obesity     Prediabetes 5/13/14       Past Surgical History:   Procedure Laterality Date    COLONOSCOPY N/A 11/16/2016    COLONOSCOPY performed by Jannette Medrano MD at [address] [facility] COLONOSCOPY  11/17/16    Dr. Jola Ormond Prescriptions as of 9/19/2018   Medication Sig Dispense Refill    atorvastatin (LIPITOR) 10 mg tablet TAKE 1 TABLET BY MOUTH EVERY NIGHT AT BEDTIME 90 Tab 3    lisinopril (PRINIVIL, ZESTRIL) 10 mg tablet TAKE 1 TABLET BY MOUTH DAILY 90 Tab 3    ASPIRIN PO Take 81 mg by mouth daily.  psyllium seed-sucrose (METAMUCIL) powd Take  by mouth. Use twice daily       No facility-administered encounter medications on file as of 9/19/2018. No Known Allergies     Social History     Social History    Marital status:      Spouse name: N/A    Number of children: N/A    Years of education: N/A     Occupational History    Not on file.      Social History Main Topics    Smoking status: Never Smoker    Smokeless tobacco: Never Used    Alcohol use 0.0 oz/week     0 Standard drinks or equivalent per week      Comment: rarely    Drug use: No    Sexual activity: Yes     Partners: Female     Other Topics Concern    Not on file     Social History Narrative        family history includes Cancer in his brother and father; Diabetes in his mother; No Known Problems in his son; Stroke in his mother. Review of Systems   Constitutional: Negative for chills, diaphoresis, fever, malaise/fatigue and weight loss. HENT: Negative for hearing loss. Respiratory: Negative for cough. Cardiovascular: Negative for chest pain. Gastrointestinal: Negative for blood in stool and constipation. Genitourinary: Positive for frequency. Negative for dysuria, flank pain, hematuria and urgency. Musculoskeletal: Negative for myalgias. Skin: Negative for rash. Neurological: Negative for dizziness, weakness and headaches. Endo/Heme/Allergies: Does not bruise/bleed easily. Physical Exam   Constitutional: He appears well-developed and well-nourished. He is active. Non-toxic appearance. He does not have a sickly appearance. He does not appear ill. No distress. Eyes: Conjunctivae are normal. Right eye exhibits no discharge. Cardiovascular: Normal rate, regular rhythm, S1 normal, S2 normal, normal heart sounds and normal pulses. Exam reveals no gallop and no friction rub. Pulmonary/Chest: Effort normal and breath sounds normal. No respiratory distress. Abdominal: Soft. Bowel sounds are normal.   Musculoskeletal: He exhibits no edema or deformity. Neurological: He is alert. Skin: Skin is warm and dry. No rash noted. No pallor. Psychiatric: He has a normal mood and affect. His behavior is normal.   Vitals reviewed. Ortho Exam      No orders of the defined types were placed in this encounter. I have reviewed the patient's medical history in detail and updated the computerized patient record.      We had a prolonged discussion about these complex clinical issues and went over the various important aspects to consider. All questions were answered. Advised him to call back or return to office if symptoms do not improve, change in nature, or persist.    He was given an after visit summary or informed of [employer] Access which includes patient instructions, diagnoses, current medications, & vitals. He expressed understanding with the diagnosis and plan.

## 2018-09-19 NOTE — MR AVS SNAPSHOT
[address] [place] [address], [address] [place] [address] 
[phone] Patient: Mary Reyes MRN: [id number] PBZ:[id number][birth year]20[id number] Visit Information Date & Time Provider Department Dept. Phone Encounter #  
 9/19/2018 10:30 AM Angeles Kingston MD [place] [id number] [facility] [phone] [id number] Follow-up Instructions Return in about 6 months (around 3/19/2019) for For Medicare Wellness visit 30 minutes. Upcoming Health Maintenance Date Due  
 GLAUCOMA SCREENING Q2Y 1/30/2017 Influenza Age 5 to Adult 9/11/2019* MEDICARE YEARLY EXAM 3/21/2019 DTaP/Tdap/Td series (2 - Td) 1/10/2024 COLONOSCOPY 11/16/2026 *Topic was postponed. The date shown is not the original due date. Allergies as of 9/19/2018  Review Complete On: 9/19/2018 By: Angeles Kingston MD  
 No Known Allergies Current Immunizations  Never Reviewed Name Date Influenza High Dose Vaccine PF 9/14/2016 11:40 AM  
 Influenza Vaccine 12/1/2015, 12/1/2013 Pneumococcal Conjugate (PCV-13) 3/8/2017 Tdap 1/10/2014  1:07 PM  
 Zoster Vaccine, Live 12/1/2014 Not reviewed this visit You Were Diagnosed With   
  
 Codes Comments Increased prostate specific antigen (PSA) velocity    -  Primary ICD-10-CM: R97.20 ICD-9-CM: 790.93 Benign essential hypertension     ICD-10-CM: I10 
ICD-9-CM: 401.1 Hypercholesterolemia     ICD-10-CM: E78.00 ICD-9-CM: 272.0 Obesity without serious comorbidity, unspecified classification, unspecified obesity type     ICD-10-CM: E66.9 ICD-9-CM: 278.00 Prediabetes     ICD-10-CM: R73.03 
ICD-9-CM: 790.29 IFG (impaired fasting glucose)     ICD-10-CM: R73.01 
ICD-9-CM: 790.21 Benign prostatic hyperplasia with urinary frequency     ICD-10-CM: N40.1, R35.0 ICD-9-CM: 600.01, 788.41 Vitals BP Pulse Temp Resp Height(growth percentile) Weight(growth percentile) 126/70 (BP 1 Location: Left arm, BP Patient Position: Sitting) 73 98 °F (36.7 °C) (Oral) 18 5' 4.5\" (1.638 m) 198 lb 12.8 oz (90.2 kg) SpO2 BMI Smoking Status 93% 33.6 kg/m2 Never Smoker Vitals History BMI and BSA Data Body Mass Index Body Surface Area  
 33.6 kg/m 2 2.03 m 2 Preferred Pharmacy Pharmacy Name Phone [id number] [place], [address] [place] [address] [phone] Your Updated Medication List  
  
   
This list is accurate as of 9/19/18 11:08 AM.  Always use your most recent med list.  
  
  
  
  
 ASPIRIN PO Take 81 mg by mouth daily. atorvastatin 10 mg tablet Commonly known as:  LIPITOR  
TAKE 1 TABLET BY MOUTH EVERY NIGHT AT BEDTIME  
  
 lisinopril 10 mg tablet Commonly known as:  PRINIVIL, ZESTRIL  
TAKE 1 TABLET BY MOUTH DAILY METAMUCIL (SUGAR) Powd Generic drug:  psyllium seed-sucrose Take  by mouth. Use twice daily  
  
 tamsulosin 0.4 mg capsule Commonly known as:  FLOMAX Take 1 Cap by mouth daily for 30 days. Prescriptions Sent to Pharmacy Refills  
 tamsulosin (FLOMAX) 0.4 mg capsule 5 Sig: Take 1 Cap by mouth daily for 30 days. Class: Normal  
 Pharmacy: [facility] [address], [address] [place] [address] Ph #: [phone] Route: Oral  
  
Follow-up Instructions Return in about 6 months (around 3/19/2019) for For Medicare Wellness visit 30 minutes. To-Do List   
 Around 03/18/2019 Lab:  HEMOGLOBIN A1C WITH EAG Around 03/18/2019 Lab:  LIPID PANEL Around 03/18/2019 Lab:  METABOLIC PANEL, COMPREHENSIVE Around 03/18/2019 Lab:  PSA TOTAL (REFLEX TO FREE) Patient Instructions Appointment on 08/24/2018 Component Date Value Ref Range Status  Cholesterol, total 08/24/2018 175  100 - 199 mg/dL Final  
 Triglyceride 08/24/2018 180* 0 - 149 mg/dL Final  
  HDL Cholesterol 08/24/2018 45  >39 mg/dL Final  
 VLDL, calculated 08/24/2018 36  5 - 40 mg/dL Final  
 LDL, calculated 08/24/2018 94  0 - 99 mg/dL Final  
 Specific Gravity 08/24/2018 1.019  1.005 - 1.030 Final  
 pH (UA) 08/24/2018 5.5  5.0 - 7.5 Final  
 Color 08/24/2018 Yellow  Yellow Final  
 Appearance 08/24/2018 Clear  Clear Final  
 Leukocyte Esterase 08/24/2018 Negative  Negative Final  
 Protein 08/24/2018 Negative  Negative/Trace Final  
 Glucose 08/24/2018 Negative  Negative Final  
 Ketone 08/24/2018 Negative  Negative Final  
 Blood 08/24/2018 Negative  Negative Final  
 Bilirubin 08/24/2018 Negative  Negative Final  
 Urobilinogen 08/24/2018 0.2  0.2 - 1.0 mg/dL Final  
 Nitrites 08/24/2018 Negative  Negative Final  
 Microscopic Examination 08/24/2018 Comment   Final  
 Microscopic not indicated and not performed.  WBC 08/24/2018 6.8  3.4 - 10.8 x10E3/uL Final  
 RBC 08/24/2018 4.78  4.14 - 5.80 x10E6/uL Final  
 HGB 08/24/2018 14.6  13.0 - 17.7 g/dL Final  
 HCT 08/24/2018 42.6  37.5 - 51.0 % Final  
 MCV 08/24/2018 89  79 - 97 fL Final  
 MCH 08/24/2018 30.5  26.6 - 33.0 pg Final  
 MCHC 08/24/2018 34.3  31.5 - 35.7 g/dL Final  
 RDW 08/24/2018 13.3  12.3 - 15.4 % Final  
 PLATELET 04/90/0863 963  150 - 379 x10E3/uL Final  
 NEUTROPHILS 08/24/2018 61  Not Estab. % Final  
 Lymphocytes 08/24/2018 29  Not Estab. % Final  
 MONOCYTES 08/24/2018 5  Not Estab. % Final  
 EOSINOPHILS 08/24/2018 5  Not Estab. % Final  
 BASOPHILS 08/24/2018 0  Not Estab. % Final  
 ABS. NEUTROPHILS 08/24/2018 4.2  1.4 - 7.0 x10E3/uL Final  
 Abs Lymphocytes 08/24/2018 2.0  0.7 - 3.1 x10E3/uL Final  
 ABS. MONOCYTES 08/24/2018 0.3  0.1 - 0.9 x10E3/uL Final  
 ABS. EOSINOPHILS 08/24/2018 0.3  0.0 - 0.4 x10E3/uL Final  
 ABS.  BASOPHILS 08/24/2018 0.0  0.0 - 0.2 x10E3/uL Final  
 IMMATURE GRANULOCYTES 08/24/2018 0  Not Estab. % Final  
  ABS. IMM. GRANS. 08/24/2018 0.0  0.0 - 0.1 x10E3/uL Final  
 Glucose 08/24/2018 96  65 - 99 mg/dL Final  
 BUN 08/24/2018 14  8 - 27 mg/dL Final  
 Creatinine 08/24/2018 1.14  0.76 - 1.27 mg/dL Final  
 GFR est non-AA 08/24/2018 65  >59 mL/min/1.73 Final  
 GFR est AA 08/24/2018 75  >59 mL/min/1.73 Final  
 BUN/Creatinine ratio 08/24/2018 12  10 - 24 Final  
 Sodium 08/24/2018 139  134 - 144 mmol/L Final  
 Potassium 08/24/2018 4.6  3.5 - 5.2 mmol/L Final  
 Chloride 08/24/2018 98  96 - 106 mmol/L Final  
 CO2 08/24/2018 21  20 - 29 mmol/L Final  
 Calcium 08/24/2018 9.4  8.6 - 10.2 mg/dL Final  
 Protein, total 08/24/2018 7.5  6.0 - 8.5 g/dL Final  
 Albumin 08/24/2018 4.7  3.6 - 4.8 g/dL Final  
 GLOBULIN, TOTAL 08/24/2018 2.8  1.5 - 4.5 g/dL Final  
 A-G Ratio 08/24/2018 1.7  1.2 - 2.2 Final  
 Bilirubin, total 08/24/2018 0.6  0.0 - 1.2 mg/dL Final  
 Alk. phosphatase 08/24/2018 69  39 - 117 IU/L Final  
 AST (SGOT) 08/24/2018 23  0 - 40 IU/L Final  
 ALT (SGPT) 08/24/2018 27  0 - 44 IU/L Final  
 Prostate Specific Ag 08/24/2018 3.4  0.0 - 4.0 ng/mL Final  
 Comment: Roche ECLIA methodology. According to the American Urological Association, Serum PSA should 
decrease and remain at undetectable levels after radical 
prostatectomy. The AUA defines biochemical recurrence as an initial 
PSA value 0.2 ng/mL or greater followed by a subsequent confirmatory PSA value 0.2 ng/mL or greater. Values obtained with different assay methods or kits cannot be used 
interchangeably. Results cannot be interpreted as absolute evidence 
of the presence or absence of malignant disease.  Reflex Criteria 08/24/2018 Comment   Final  
 Comment: The percent free PSA is performed on a reflex basis only when the 
total PSA is between 4.0 and 10.0 ng/mL.  Creatinine, urine 08/24/2018 259.6  Not Estab. mg/dL Final  
 Microalbumin, urine 08/24/2018 21.6  Not Estab. ug/mL Final  
  Microalb/Creat ratio (ug/mg creat.) 08/24/2018 8.3  0.0 - 30.0 mg/g creat Final  
 Hemoglobin A1c 08/24/2018 6.1* 4.8 - 5.6 % Final  
 Comment:          Prediabetes: 5.7 - 6.4 Diabetes: >6.4 Glycemic control for adults with diabetes: <7.0  Estimated average glucose 08/24/2018 128  mg/dL Final  
 INTERPRETATION 08/24/2018 Note   Final  
 Supplemental report is available. Start [facility] Teofilo Tamsulosin (By mouth) Tamsulosin Hydrochloride (mxo-CLE-nas-sin lucero-droe-KLOR-christy) Treats benign prostatic hyperplasia (enlarged prostate). Brand Name(s): Flomax There may be other brand names for this medicine. When This Medicine Should Not Be Used: This medicine is not right for everyone. Do not use it if you had an allergic reaction to tamsulosin. How to Use This Medicine:  
Capsule · Take your medicine as directed. Your dose may need to be changed several times to find what works best for you. · Take this medicine 30 minutes after the same meal each day. Swallow the capsule whole. Do not crush, chew, or open it. · Read and follow the patient instructions that come with this medicine. Talk to your doctor or pharmacist if you have any questions. · Missed dose: Take a dose as soon as you remember. If it is almost time for your next dose, wait until then and take a regular dose. Do not take extra medicine to make up for a missed dose. If you forget to take this medicine for several days in a row, talk to your doctor before you start taking it again. · Store the medicine in a closed container at room temperature, away from heat, moisture, and direct light. Drugs and Foods to Avoid: Ask your doctor or pharmacist before using any other medicine, including over-the-counter medicines, vitamins, and herbal products. · Some medicines can affect how tamsulosin works.  Tell your doctor if you are using another alpha blocker medicine, cimetidine, erythromycin, ketoconazole, paroxetine, terbinafine, warfarin, or medicine for erectile dysfunction. Warnings While Using This Medicine: · Tell your doctor if you have kidney disease, liver disease, low blood pressure, prostate cancer, or an allergy to sulfa drugs. · Tell your doctor if you plan to have cataract or glaucoma surgery. This medicine may cause eye problems during surgery. · This medicine may make you dizzy or lightheaded. Do not drive or do anything else that could be dangerous until you know how this medicine affects you. Stand or sit up slowly if you feel dizzy. · Your doctor will check your progress and the effects of this medicine at regular visits. Keep all appointments. · Keep all medicine out of the reach of children. Never share your medicine with anyone. Possible Side Effects While Using This Medicine:  
Call your doctor right away if you notice any of these side effects: · Allergic reaction: Itching or hives, swelling in your face or hands, swelling or tingling in your mouth or throat, chest tightness, trouble breathing · Blistering, peeling, red skin rash · Lightheadedness, dizziness, fainting · Painful, prolonged erection of your penis If you notice these less serious side effects, talk with your doctor:  
· Headache · Problems with ejaculation · Runny or stuffy nose If you notice other side effects that you think are caused by this medicine, tell your doctor. Call your doctor for medical advice about side effects. You may report side effects to FDA at 9-942-FDA-2666 © 2017 [facility] Information is for End User's use only and may not be sold, redistributed or otherwise used for commercial purposes. The above information is an  only. It is not intended as medical advice for individual conditions or treatments. Talk to your doctor, nurse or pharmacist before following any medical regimen to see if it is safe and effective for you. Benign Prostatic Hyperplasia: Care Instructions Your Care Instructions Benign prostatic hyperplasia, or BPH, is an enlarged prostate gland. The prostate is a small gland that makes some of the fluid in semen. Prostate enlargement happens to almost all men as they age. It is usually not serious. BPH does not cause prostate cancer. As the prostate gets bigger, it may partly block the flow of urine. You may have a hard time getting a urine stream started or completely stopped. BPH can cause dribbling. You may have a weak urine stream, or you may have to urinate more often than you used to, especially at night. Most men find these problems easy to manage. You do not need treatment unless your symptoms bother you a lot or you have other problems, such as bladder infections or stones. In these cases, medicines may help. Surgery is not needed unless the urine flow is blocked or the symptoms do not get better with medicine. Follow-up care is a key part of your treatment and safety. Be sure to make and go to all appointments, and call your doctor if you are having problems. It's also a good idea to know your test results and keep a list of the medicines you take. How can you care for yourself at home? · Take plenty of time to urinate. Try to relax. · Try \"double voiding. \" Urinate as much you can, relax for a few moments, and then try to urinate again. · Sit on the toilet to urinate. · Read or think of other things while you are waiting. · Turn on a faucet, or try to picture running water. Some men find that this helps get their urine flowing. · If dribbling is a problem, wash your penis daily to avoid skin irritation and infection. · Avoid caffeine and alcohol. These drinks will increase how often you need to urinate. Spread your fluid intake throughout the day. If the urge to urinate often wakes you at night, limit your fluid intake in the evening. Urinate right before you go to bed. · Many over-the-counter cold and allergy medicines can make the symptoms of BPH worse. Avoid antihistamines, decongestants, and allergy pills, if you can. Read the warnings on the package. · If you take any prescription medicines, especially tranquilizers or antidepressants, ask your doctor or pharmacist whether they can cause urination problems. There may be other medicines you can use that do not cause urinary problems. · Be safe with medicines. Take your medicines exactly as prescribed. Call your doctor if you think you are having a problem with your medicine. When should you call for help? Call your doctor now or seek immediate medical care if: 
  · You cannot urinate at all.  
  · You have symptoms of a urinary infection. For example: ¨ You have blood or pus in your urine. ¨ You have pain in your back just below your rib cage. This is called flank pain. ¨ You have a fever, chills, or body aches. ¨ It hurts to urinate. ¨ You have groin or belly pain.  
 Watch closely for changes in your health, and be sure to contact your doctor if: 
  · It hurts when you ejaculate.  
  · Your urinary problems get a lot worse or bother you a lot. Where can you learn more? Go to http://jw-ryan.info/. Enter L244 in the search box to learn more about \"Benign Prostatic Hyperplasia: Care Instructions. \" Current as of: December 3, 2017 Content Version: 11.7 © 7758-1775 [employer]. Care instructions adapted under license by [employer] (which disclaims liability or warranty for this information). If you have questions about a medical condition or this instruction, always ask your healthcare professional. [address] any warranty or liability for your use of this information. Introducing [facility]!    
 Shanti Triana introduces [employer] patient portal. Now you can access parts of your medical record, email your doctor's office, and request medication refills online. 1. In your internet browser, go to [email or web address] 2. Click on the First Time User? Click Here link in the Sign In box. You will see the New Member Sign Up page. 3. Enter your [employer] Access Code exactly as it appears below. You will not need to use this code after youve completed the sign-up process. If you do not sign up before the expiration date, you must request a new code. · [employer] Access Code: [email or web address] Expires: 12/18/2018 11:08 AM 
 
4. Enter the last four digits of your Social Security Number (xxxx) and Date of Birth (mm/dd/yyyy) as indicated and click Submit. You will be taken to the next sign-up page. 5. Create a [employer] ID. This will be your [employer] login ID and cannot be changed, so think of one that is secure and easy to remember. 6. Create a [employer] password. You can change your password at any time. 7. Enter your Password Reset Question and Answer. This can be used at a later time if you forget your password. 8. Enter your e-mail address. You will receive e-mail notification when new information is available in [address] 9. Click Sign Up. You can now view and download portions of your medical record. 10. Click the Download Summary menu link to download a portable copy of your medical information. If you have questions, please visit the Frequently Asked Questions section of the [employer] website. Remember, [employer] is NOT to be used for urgent needs. For medical emergencies, dial 911. Now available from your iPhone and Android! Please provide this summary of care documentation to your next provider. Your primary care clinician is listed as Moody Vela. If you have any questions after today's visit, please call [phone].

## 2018-09-19 NOTE — PATIENT INSTRUCTIONS
Appointment on 08/24/2018   Component Date Value Ref Range Status    Cholesterol, total 08/24/2018 175  100 - 199 mg/dL Final    Triglyceride 08/24/2018 180* 0 - 149 mg/dL Final    HDL Cholesterol 08/24/2018 45  >39 mg/dL Final    VLDL, calculated 08/24/2018 36  5 - 40 mg/dL Final    LDL, calculated 08/24/2018 94  0 - 99 mg/dL Final    Specific Gravity 08/24/2018 1.019  1.005 - 1.030 Final    pH (UA) 08/24/2018 5.5  5.0 - 7.5 Final    Color 08/24/2018 Yellow  Yellow Final    Appearance 08/24/2018 Clear  Clear Final    Leukocyte Esterase 08/24/2018 Negative  Negative Final    Protein 08/24/2018 Negative  Negative/Trace Final    Glucose 08/24/2018 Negative  Negative Final    Ketone 08/24/2018 Negative  Negative Final    Blood 08/24/2018 Negative  Negative Final    Bilirubin 08/24/2018 Negative  Negative Final    Urobilinogen 08/24/2018 0.2  0.2 - 1.0 mg/dL Final    Nitrites 08/24/2018 Negative  Negative Final    Microscopic Examination 08/24/2018 Comment   Final    Microscopic not indicated and not performed.  WBC 08/24/2018 6.8  3.4 - 10.8 x10E3/uL Final    RBC 08/24/2018 4.78  4.14 - 5.80 x10E6/uL Final    HGB 08/24/2018 14.6  13.0 - 17.7 g/dL Final    HCT 08/24/2018 42.6  37.5 - 51.0 % Final    MCV 08/24/2018 89  79 - 97 fL Final    MCH 08/24/2018 30.5  26.6 - 33.0 pg Final    MCHC 08/24/2018 34.3  31.5 - 35.7 g/dL Final    RDW 08/24/2018 13.3  12.3 - 15.4 % Final    PLATELET 75/58/0556 731  150 - 379 x10E3/uL Final    NEUTROPHILS 08/24/2018 61  Not Estab. % Final    Lymphocytes 08/24/2018 29  Not Estab. % Final    MONOCYTES 08/24/2018 5  Not Estab. % Final    EOSINOPHILS 08/24/2018 5  Not Estab. % Final    BASOPHILS 08/24/2018 0  Not Estab. % Final    ABS. NEUTROPHILS 08/24/2018 4.2  1.4 - 7.0 x10E3/uL Final    Abs Lymphocytes 08/24/2018 2.0  0.7 - 3.1 x10E3/uL Final    ABS. MONOCYTES 08/24/2018 0.3  0.1 - 0.9 x10E3/uL Final    ABS.  EOSINOPHILS 08/24/2018 0.3  0.0 - 0.4 x10E3/uL Final    ABS. BASOPHILS 08/24/2018 0.0  0.0 - 0.2 x10E3/uL Final    IMMATURE GRANULOCYTES 08/24/2018 0  Not Estab. % Final    ABS. IMM. GRANS. 08/24/2018 0.0  0.0 - 0.1 x10E3/uL Final    Glucose 08/24/2018 96  65 - 99 mg/dL Final    BUN 08/24/2018 14  8 - 27 mg/dL Final    Creatinine 08/24/2018 1.14  0.76 - 1.27 mg/dL Final    GFR est non-AA 08/24/2018 65  >59 mL/min/1.73 Final    GFR est AA 08/24/2018 75  >59 mL/min/1.73 Final    BUN/Creatinine ratio 08/24/2018 12  10 - 24 Final    Sodium 08/24/2018 139  134 - 144 mmol/L Final    Potassium 08/24/2018 4.6  3.5 - 5.2 mmol/L Final    Chloride 08/24/2018 98  96 - 106 mmol/L Final    CO2 08/24/2018 21  20 - 29 mmol/L Final    Calcium 08/24/2018 9.4  8.6 - 10.2 mg/dL Final    Protein, total 08/24/2018 7.5  6.0 - 8.5 g/dL Final    Albumin 08/24/2018 4.7  3.6 - 4.8 g/dL Final    GLOBULIN, TOTAL 08/24/2018 2.8  1.5 - 4.5 g/dL Final    A-G Ratio 08/24/2018 1.7  1.2 - 2.2 Final    Bilirubin, total 08/24/2018 0.6  0.0 - 1.2 mg/dL Final    Alk. phosphatase 08/24/2018 69  39 - 117 IU/L Final    AST (SGOT) 08/24/2018 23  0 - 40 IU/L Final    ALT (SGPT) 08/24/2018 27  0 - 44 IU/L Final    Prostate Specific Ag 08/24/2018 3.4  0.0 - 4.0 ng/mL Final    Comment: Roche ECLIA methodology. According to the American Urological Association, Serum PSA should  decrease and remain at undetectable levels after radical  prostatectomy. The AUA defines biochemical recurrence as an initial  PSA value 0.2 ng/mL or greater followed by a subsequent confirmatory  PSA value 0.2 ng/mL or greater. Values obtained with different assay methods or kits cannot be used  interchangeably. Results cannot be interpreted as absolute evidence  of the presence or absence of malignant disease.  Reflex Criteria 08/24/2018 Comment   Final    Comment: The percent free PSA is performed on a reflex basis only when the  total PSA is between 4.0 and 10.0 ng/mL.       Creatinine, urine 08/24/2018 259.6  Not Estab. mg/dL Final    Microalbumin, urine 08/24/2018 21.6  Not Estab. ug/mL Final    Microalb/Creat ratio (ug/mg creat.) 08/24/2018 8.3  0.0 - 30.0 mg/g creat Final    Hemoglobin A1c 08/24/2018 6.1* 4.8 - 5.6 % Final    Comment:          Prediabetes: 5.7 - 6.4           Diabetes: >6.4           Glycemic control for adults with diabetes: <7.0      Estimated average glucose 08/24/2018 128  mg/dL Final    INTERPRETATION 08/24/2018 Note   Final    Supplemental report is available. Start Saw Palmetto    Tamsulosin (By mouth)   Tamsulosin Hydrochloride (hun-WYR-pda-sin lucero-droe-KLOR-christy)  Treats benign prostatic hyperplasia (enlarged prostate). Brand Name(s): Flomax   There may be other brand names for this medicine. When This Medicine Should Not Be Used: This medicine is not right for everyone. Do not use it if you had an allergic reaction to tamsulosin. How to Use This Medicine:   Capsule  · Take your medicine as directed. Your dose may need to be changed several times to find what works best for you. · Take this medicine 30 minutes after the same meal each day. Swallow the capsule whole. Do not crush, chew, or open it. · Read and follow the patient instructions that come with this medicine. Talk to your doctor or pharmacist if you have any questions. · Missed dose: Take a dose as soon as you remember. If it is almost time for your next dose, wait until then and take a regular dose. Do not take extra medicine to make up for a missed dose. If you forget to take this medicine for several days in a row, talk to your doctor before you start taking it again. · Store the medicine in a closed container at room temperature, away from heat, moisture, and direct light. Drugs and Foods to Avoid:   Ask your doctor or pharmacist before using any other medicine, including over-the-counter medicines, vitamins, and herbal products. · Some medicines can affect how tamsulosin works.  Tell your doctor if you are using another alpha blocker medicine, cimetidine, erythromycin, ketoconazole, paroxetine, terbinafine, warfarin, or medicine for erectile dysfunction. Warnings While Using This Medicine:   · Tell your doctor if you have kidney disease, liver disease, low blood pressure, prostate cancer, or an allergy to sulfa drugs. · Tell your doctor if you plan to have cataract or glaucoma surgery. This medicine may cause eye problems during surgery. · This medicine may make you dizzy or lightheaded. Do not drive or do anything else that could be dangerous until you know how this medicine affects you. Stand or sit up slowly if you feel dizzy. · Your doctor will check your progress and the effects of this medicine at regular visits. Keep all appointments. · Keep all medicine out of the reach of children. Never share your medicine with anyone. Possible Side Effects While Using This Medicine:   Call your doctor right away if you notice any of these side effects:  · Allergic reaction: Itching or hives, swelling in your face or hands, swelling or tingling in your mouth or throat, chest tightness, trouble breathing  · Blistering, peeling, red skin rash  · Lightheadedness, dizziness, fainting  · Painful, prolonged erection of your penis  If you notice these less serious side effects, talk with your doctor:   · Headache  · Problems with ejaculation  · Runny or stuffy nose  If you notice other side effects that you think are caused by this medicine, tell your doctor. Call your doctor for medical advice about side effects. You may report side effects to FDA at 1-426-FDA-1058  © 2017 [address] [employer] Information is for End User's use only and may not be sold, redistributed or otherwise used for commercial purposes. The above information is an  only. It is not intended as medical advice for individual conditions or treatments.  Talk to your doctor, nurse or pharmacist before following any medical regimen to see if it is safe and effective for you. Benign Prostatic Hyperplasia: Care Instructions  Your Care Instructions    Benign prostatic hyperplasia, or BPH, is an enlarged prostate gland. The prostate is a small gland that makes some of the fluid in semen. Prostate enlargement happens to almost all men as they age. It is usually not serious. BPH does not cause prostate cancer. As the prostate gets bigger, it may partly block the flow of urine. You may have a hard time getting a urine stream started or completely stopped. BPH can cause dribbling. You may have a weak urine stream, or you may have to urinate more often than you used to, especially at night. Most men find these problems easy to manage. You do not need treatment unless your symptoms bother you a lot or you have other problems, such as bladder infections or stones. In these cases, medicines may help. Surgery is not needed unless the urine flow is blocked or the symptoms do not get better with medicine. Follow-up care is a key part of your treatment and safety. Be sure to make and go to all appointments, and call your doctor if you are having problems. It's also a good idea to know your test results and keep a list of the medicines you take. How can you care for yourself at home? · Take plenty of time to urinate. Try to relax. · Try \"double voiding. \" Urinate as much you can, relax for a few moments, and then try to urinate again. · Sit on the toilet to urinate. · Read or think of other things while you are waiting. · Turn on a faucet, or try to picture running water. Some men find that this helps get their urine flowing. · If dribbling is a problem, wash your penis daily to avoid skin irritation and infection. · Avoid caffeine and alcohol. These drinks will increase how often you need to urinate. Spread your fluid intake throughout the day.  If the urge to urinate often wakes you at night, limit your fluid intake in the evening. Urinate right before you go to bed. · Many over-the-counter cold and allergy medicines can make the symptoms of BPH worse. Avoid antihistamines, decongestants, and allergy pills, if you can. Read the warnings on the package. · If you take any prescription medicines, especially tranquilizers or antidepressants, ask your doctor or pharmacist whether they can cause urination problems. There may be other medicines you can use that do not cause urinary problems. · Be safe with medicines. Take your medicines exactly as prescribed. Call your doctor if you think you are having a problem with your medicine. When should you call for help? Call your doctor now or seek immediate medical care if:    · You cannot urinate at all.     · You have symptoms of a urinary infection. For example:  ¨ You have blood or pus in your urine. ¨ You have pain in your back just below your rib cage. This is called flank pain. ¨ You have a fever, chills, or body aches. ¨ It hurts to urinate. ¨ You have groin or belly pain.    Watch closely for changes in your health, and be sure to contact your doctor if:    · It hurts when you ejaculate.     · Your urinary problems get a lot worse or bother you a lot. Where can you learn more? Go to http://jw-ryan.info/. Enter X240 in the search box to learn more about \"Benign Prostatic Hyperplasia: Care Instructions. \"  Current as of: December 3, 2017  Content Version: 11.7  © 1953-5956 [employer]. Care instructions adapted under license by [employer] (which disclaims liability or warranty for this information). If you have questions about a medical condition or this instruction, always ask your healthcare professional. [place] [address] any warranty or liability for your use of this information.

## 2018-09-19 NOTE — PROGRESS NOTES
Reviewed record in preparation for visit and have obtained necessary documentation. Identified pt with two pt identifiers(name and ). Health Maintenance Due   Topic    GLAUCOMA SCREENING Q2Y     Influenza Age 5 to Adult          Chief Complaint   Patient presents with    Complete Physical        Wt Readings from Last 3 Encounters:   18 198 lb 12.8 oz (90.2 kg)   18 195 lb 9.6 oz (88.7 kg)   17 193 lb (87.5 kg)     Temp Readings from Last 3 Encounters:   18 99.6 °F (37.6 °C) (Oral)   17 98.7 °F (37.1 °C) (Oral)   17 97.7 °F (36.5 °C) (Oral)     BP Readings from Last 3 Encounters:   18 120/70   17 160/90   17 118/72     Pulse Readings from Last 3 Encounters:   18 75   17 76   17 68           Learning Assessment:  :     Learning Assessment 2018 2017 3/11/2016 2014 1/10/2014   PRIMARY LEARNER Patient Patient Patient Patient Patient   HIGHEST LEVEL OF EDUCATION - PRIMARY LEARNER  4 YEARS OF COLLEGE 4 YEARS OF COLLEGE 4 YEARS OF COLLEGE - -   BARRIERS PRIMARY LEARNER NONE NONE NONE NONE NONE   CO-LEARNER CAREGIVER - - No No No   CO-LEARNER HIGHEST LEVEL OF EDUCATION - - - - 2 YEARS OF COLLEGE   PRIMARY LANGUAGE ENGLISH ENGLISH ENGLISH ENGLISH ENGLISH    NEED - - No No No   LEARNER PREFERENCE PRIMARY DEMONSTRATION LISTENING LISTENING DEMONSTRATION READING   LEARNING SPECIAL TOPICS - - - no no   ANSWERED BY patient Patient  patient patient patient   RELATIONSHIP SELF SELF SELF SELF SELF       Depression Screening:  :     PHQ over the last two weeks 2018   Little interest or pleasure in doing things Not at all   Feeling down, depressed, irritable, or hopeless Not at all   Total Score PHQ 2 0       Fall Risk Assessment:  :     Fall Risk Assessment, last 12 mths 2018   Able to walk? Yes   Fall in past 12 months?  No   Fall with injury? -   Number of falls in past 12 months -   Fall Risk Score -       Abuse Screening:  :     Abuse Screening Questionnaire 9/19/2018 9/19/2017 3/11/2016 9/15/2014   Do you ever feel afraid of your partner? N N N N   Are you in a relationship with someone who physically or mentally threatens you? N N N N   Is it safe for you to go home? Y Y Y Y       Coordination of Care Questionnaire:  :     1) Have you been to an emergency room, urgent care clinic since your last visit? no   Hospitalized since your last visit? no             2) Have you seen or consulted any other health care providers outside of [address] [place] [address] since your last visit? no  (Include any pap smears or colon screenings in this section.)    3) Do you have an Advance Directive on file? no    4) Are you interested in receiving information on Advance Directives? NO      Patient is accompanied by self I have received verbal consent from Shellie Gomes to discuss any/all medical information while they are present in the room.

## 2018-09-20 ENCOUNTER — TELEPHONE (OUTPATIENT)
Dept: INTERNAL MEDICINE CLINIC | Age: 68
End: 2018-09-20

## 2018-09-20 NOTE — TELEPHONE ENCOUNTER
Called patient    Picked up flomax and saw palmetto    Fasted yesterday and broke the fast 7:30    11 pm- took the flomax. Got up 1:30 and voided. 3:30 woke up and feel like I had to void. Couldn't void. [place] faint and went to turn around to go back to bed. Did not lose consciousness. Told him to stop flomax.

## 2018-09-20 NOTE — TELEPHONE ENCOUNTER
Patient called stating that he was given tamulosin yesterday along with a vitamin for his prostate. He says that he took the medication last night and woke up around 3 this morning feeling like he needed to urinate. he went to the bathroom and could not urinate. he says that he felt dizzy/faint. When he got up from the toilet he fell and noticed that he was also having cold sweats. He feels it was due to the medication. He would like  to call him back at  [phone] or [phone]

## 2018-10-31 RX ORDER — LISINOPRIL 10 MG/1
TABLET ORAL
Qty: 90 TAB | Refills: 3 | Status: SHIPPED | OUTPATIENT
Start: 2018-10-31 | End: 2019-03-21 | Stop reason: SDUPTHER

## 2018-11-19 ENCOUNTER — TELEPHONE (OUTPATIENT)
Dept: INTERNAL MEDICINE CLINIC | Age: 68
End: 2018-11-19

## 2018-11-19 RX ORDER — SILODOSIN 8 MG/1
8 CAPSULE ORAL
Qty: 30 CAP | Refills: 3 | Status: SHIPPED | OUTPATIENT
Start: 2018-11-19 | End: 2018-12-19

## 2018-11-19 NOTE — TELEPHONE ENCOUNTER
We can try a different medicine. I don't like to increase flomax beyond this dose    New medicine called rapaflo called in.   Let me know if too expensive    Stop tamsulosin

## 2018-11-19 NOTE — TELEPHONE ENCOUNTER
Contacted patient for further detail    Contacted patient for further detail  Waking up at night several times at night to urinate. There has been no improvement in his condition since starting medication. No new symptoms concerning him. Next appointment in March of next year.      Patient would like to know if he needs to increase dose of medication or try a new medication for issues with PSA

## 2018-11-19 NOTE — TELEPHONE ENCOUNTER
[address] [facility] Office   Phone Number: [phone]             Pt advises that he does not think his Tamsulosin is working. Pt would like to discuss a new medication.

## 2018-11-20 NOTE — TELEPHONE ENCOUNTER
That is all that is left. There is one more called uroxatral but that is likely expensive too. If he wants he can see an urologist to discuss other options that are not medicine.

## 2018-11-20 NOTE — TELEPHONE ENCOUNTER
Patient reports medication is not covered by insurance and too expensive.  Would like to try something else

## 2018-11-26 NOTE — TELEPHONE ENCOUNTER
Called pharmacy to get prior auth information. Prior auth completed and approved. Spoke with representative [email or web address]. PA# [id number]    I inquired about out of pocket cost for the medication and it will cost patient $248.62. The reason for this high cost is because patient has not satisfied his deductible. Spoke with patient. He spoke with the pharmacist who told him the full effect may not take place until on this medication for 6 months.  He will continue taking the flomax for the time being

## 2019-03-01 ENCOUNTER — HOSPITAL ENCOUNTER (OUTPATIENT)
Dept: LAB | Age: 69
Discharge: HOME | End: 2019-03-01
Payer: MEDICARE

## 2019-03-01 PROCEDURE — 36415 COLL VENOUS BLD VENIPUNCTURE: CPT

## 2019-03-01 PROCEDURE — 80061 LIPID PANEL: CPT

## 2019-03-01 PROCEDURE — 80053 COMPREHEN METABOLIC PANEL: CPT

## 2019-03-01 PROCEDURE — 84154 ASSAY OF PSA FREE: CPT

## 2019-03-01 PROCEDURE — 83036 HEMOGLOBIN GLYCOSYLATED A1C: CPT

## 2019-03-21 ENCOUNTER — OFFICE VISIT (OUTPATIENT)
Dept: INTERNAL MEDICINE CLINIC | Age: 69
End: 2019-03-21

## 2019-03-21 VITALS
HEART RATE: 74 BPM | BODY MASS INDEX: 32.59 KG/M2 | SYSTOLIC BLOOD PRESSURE: 110 MMHG | WEIGHT: 195.6 LBS | RESPIRATION RATE: 16 BRPM | OXYGEN SATURATION: 96 % | TEMPERATURE: 97.6 F | DIASTOLIC BLOOD PRESSURE: 70 MMHG | HEIGHT: 65 IN

## 2019-03-21 DIAGNOSIS — Z71.89 ADVANCED CARE PLANNING/COUNSELING DISCUSSION: ICD-10-CM

## 2019-03-21 DIAGNOSIS — N40.1 BENIGN PROSTATIC HYPERPLASIA WITH URINARY FREQUENCY: ICD-10-CM

## 2019-03-21 DIAGNOSIS — R35.0 BENIGN PROSTATIC HYPERPLASIA WITH URINARY FREQUENCY: ICD-10-CM

## 2019-03-21 DIAGNOSIS — I10 BENIGN ESSENTIAL HYPERTENSION: ICD-10-CM

## 2019-03-21 DIAGNOSIS — E78.00 HYPERCHOLESTEROLEMIA: ICD-10-CM

## 2019-03-21 DIAGNOSIS — Z00.00 MEDICARE ANNUAL WELLNESS VISIT, SUBSEQUENT: ICD-10-CM

## 2019-03-21 DIAGNOSIS — Z13.31 SCREENING FOR DEPRESSION: ICD-10-CM

## 2019-03-21 DIAGNOSIS — R73.03 PREDIABETES: Primary | ICD-10-CM

## 2019-03-21 RX ORDER — LISINOPRIL 10 MG/1
TABLET ORAL
Qty: 90 TAB | Refills: 3 | Status: SHIPPED | OUTPATIENT
Start: 2019-03-21 | End: 2019-07-29 | Stop reason: SDUPTHER

## 2019-03-21 RX ORDER — ATORVASTATIN CALCIUM 10 MG/1
TABLET, FILM COATED ORAL
Qty: 90 TAB | Refills: 3 | Status: SHIPPED | OUTPATIENT
Start: 2019-03-21 | End: 2019-07-29 | Stop reason: SDUPTHER

## 2019-03-21 NOTE — PATIENT INSTRUCTIONS
Appointment on 03/01/2019 Component Date Value Ref Range Status  Glucose 03/01/2019 107* 65 - 99 mg/dL Final  
 BUN 03/01/2019 17  8 - 27 mg/dL Final  
 Creatinine 03/01/2019 1.00  0.76 - 1.27 mg/dL Final  
 GFR est non-AA 03/01/2019 76  >59 mL/min/1.73 Final  
 GFR est AA 03/01/2019 88  >59 mL/min/1.73 Final  
 BUN/Creatinine ratio 03/01/2019 17  10 - 24 Final  
 Sodium 03/01/2019 139  134 - 144 mmol/L Final  
 Potassium 03/01/2019 4.2  3.5 - 5.2 mmol/L Final  
 Chloride 03/01/2019 103  96 - 106 mmol/L Final  
 CO2 03/01/2019 22  20 - 29 mmol/L Final  
 Calcium 03/01/2019 9.1  8.6 - 10.2 mg/dL Final  
 Protein, total 03/01/2019 7.4  6.0 - 8.5 g/dL Final  
 Albumin 03/01/2019 4.7  3.6 - 4.8 g/dL Final  
 GLOBULIN, TOTAL 03/01/2019 2.7  1.5 - 4.5 g/dL Final  
 A-G Ratio 03/01/2019 1.7  1.2 - 2.2 Final  
 Bilirubin, total 03/01/2019 0.4  0.0 - 1.2 mg/dL Final  
 Alk. phosphatase 03/01/2019 70  39 - 117 IU/L Final  
 AST (SGOT) 03/01/2019 21  0 - 40 IU/L Final  
 ALT (SGPT) 03/01/2019 31  0 - 44 IU/L Final  
 Prostate Specific Ag 03/01/2019 3.3  0.0 - 4.0 ng/mL Final  
 Comment: Roche ECLIA methodology. According to the American Urological Association, Serum PSA should 
decrease and remain at undetectable levels after radical 
prostatectomy. The AUA defines biochemical recurrence as an initial 
PSA value 0.2 ng/mL or greater followed by a subsequent confirmatory PSA value 0.2 ng/mL or greater. Values obtained with different assay methods or kits cannot be used 
interchangeably. Results cannot be interpreted as absolute evidence 
of the presence or absence of malignant disease.  Reflex Criteria 03/01/2019 Comment   Final  
 Comment: The percent free PSA is performed on a reflex basis only when the 
total PSA is between 4.0 and 10.0 ng/mL.  Hemoglobin A1c 03/01/2019 6.0* 4.8 - 5.6 % Final  
 Comment:          Prediabetes: 5.7 - 6.4 Diabetes: >6.4 Glycemic control for adults with diabetes: <7.0  Estimated average glucose 03/01/2019 126  mg/dL Final  
 Cholesterol, total 03/01/2019 145  100 - 199 mg/dL Final  
 Triglyceride 03/01/2019 111  0 - 149 mg/dL Final  
 HDL Cholesterol 03/01/2019 44  >39 mg/dL Final  
 VLDL, calculated 03/01/2019 22  5 - 40 mg/dL Final  
 LDL, calculated 03/01/2019 79  0 - 99 mg/dL Final  
 INTERPRETATION 03/01/2019 Note   Final  
 Supplemental report is available. Arnica cream for the knees

## 2019-03-21 NOTE — PROGRESS NOTES
Chief Complaint Patient presents with [facility] Reviewed record in preparation for visit and have obtained necessary documentation. Identified pt with two pt identifiers(name and ). Health Maintenance Due Topic  Shingrix Vaccine Age 50> (1 of 2)  GLAUCOMA SCREENING Q2Y  Pneumococcal 65+ years (2 of 2 - PPSV23) [address] [place] [address] Drive EXAM   
 
 
 
Chief Complaint Patient presents with [facility] Visit Wt Readings from Last 3 Encounters:  
19 195 lb 9.6 oz (88.7 kg) 18 198 lb 12.8 oz (90.2 kg) 18 195 lb 9.6 oz (88.7 kg) Temp Readings from Last 3 Encounters:  
19 97.6 °F (36.4 °C)  
18 98 °F (36.7 °C) (Oral) 18 99.6 °F (37.6 °C) (Oral) BP Readings from Last 3 Encounters:  
19 110/70  
18 126/70  
18 120/70 Pulse Readings from Last 3 Encounters:  
19 74  
18 73  
18 75 Learning Assessment: 
:  
 
Learning Assessment 3/21/2019 2018 2017 3/11/2016 2014 1/10/2014 PRIMARY LEARNER Patient Patient Patient Patient Patient Patient HIGHEST LEVEL OF EDUCATION - PRIMARY LEARNER  4 YEARS OF COLLEGE 4 YEARS OF COLLEGE 4 YEARS OF COLLEGE 4 YEARS OF COLLEGE - -  
BARRIERS PRIMARY LEARNER - NONE NONE NONE NONE NONE  
CO-LEARNER CAREGIVER - - - No No No  
CO-LEARNER HIGHEST LEVEL OF EDUCATION - - - - - 2 YEARS OF COLLEGE PRIMARY LANGUAGE ENGLISH ENGLISH ENGLISH ENGLISH ENGLISH ENGLISH  NEED - - - No No No  
LEARNER PREFERENCE PRIMARY DEMONSTRATION DEMONSTRATION LISTENING LISTENING DEMONSTRATION READING  
LEARNING SPECIAL TOPICS - - - - no no ANSWERED BY patient patient Patient  patient patient patient RELATIONSHIP SELF SELF SELF SELF SELF SELF Depression Screening: 
:  
 
3 most recent PHQ Screens 3/21/2019 Little interest or pleasure in doing things Not at all Feeling down, depressed, irritable, or hopeless Not at all Total Score PHQ 2 0 Fall Risk Assessment: 
:  
 
Fall Risk Assessment, last 12 mths 3/21/2019 Able to walk? Yes Fall in past 12 months? No  
Fall with injury? -  
Number of falls in past 12 months - Fall Risk Score -  
 
 
Abuse Screening: 
:  
 
Abuse Screening Questionnaire 3/21/2019 9/19/2018 9/19/2017 3/11/2016 9/15/2014 Do you ever feel afraid of your partner? N N N N N Are you in a relationship with someone who physically or mentally threatens you? N N N N N Is it safe for you to go home? [place] Coordination of Care Questionnaire: 
:  
 
1) Have you been to an emergency room, urgent care clinic since your last visit? no  
Hospitalized since your last visit? no          
 
2) Have you seen or consulted any other health care providers outside of [address] [place] [address] since your last visit? no  (Include any pap smears or colon screenings in this section.) 3) Do you have an Advance Directive on file? no 
 
4) Are you interested in receiving information on Advance Directives? NO Patient is accompanied by self I have received verbal consent from Jos Malagon to discuss any/all medical information while they are present in the room. Reviewed record  In preparation for visit and have obtained necessary documentation.

## 2019-03-21 NOTE — PROGRESS NOTES
Annual Wellness Visit HPI: 
Sue Henderson is a 70y.o. year old male who is here for a follow up visit. He was last seen by me on Visit date not found. He reports the following: Hypertension Patient has a history of HTN. The patient is taking hypertensive medications compliantly without side effects. Denies chest pain, dyspnea, edema, or symptoms of TIA's. BP Readings from Last 3 Encounters:  
03/21/19 110/70  
09/19/18 126/70  
03/20/18 120/70 PSA stable from 3.4 to 3.3 
 
a1c 6.0- prediabetes Discussed diet. Sweet and low coffee. Goes only twice a night. No family hx of prostate cancer. No chest pain or SOB Wt Readings from Last 3 Encounters:  
03/21/19 195 lb 9.6 oz (88.7 kg) 09/19/18 198 lb 12.8 oz (90.2 kg) 03/20/18 195 lb 9.6 oz (88.7 kg) End of visit \"by the way\"- once in a while he feels food goes down wrong pipe. No dysphagia or swallowing issues. Denies acid reflux. No chest pain or SOB. He is aware he eats too fast. 
 
Denies hearing issues but does say \"what\" a lot Interrupts conversation a lot. Assessment and Plan 1. Prediabetes Needs eye appointment. Discussed foods that cause carbs. Eliminate sweet and low as it can prevent weight loss 
- REFERRAL TO OPHTHALMOLOGY 2. Benign essential hypertension Tolerating medication. Denies dizziness that is positional, SOB, or chest pain. Understands the importance of compliance to reduce risk of future heart failure. Agreed to call if any of above symptoms develop and  stay on current regimen of   
Key CAD CHF Meds   
    
  
 lisinopril (PRINIVIL, ZESTRIL) 10 mg tablet (Taking) TAKE 1 TABLET BY MOUTH DAILY  
 atorvastatin (LIPITOR) 10 mg tablet (Taking) TAKE 1 TABLET BY MOUTH EVERY NIGHT AT BEDTIME  
 ASPIRIN PO Take 81 mg by mouth daily. 3. Hypercholesterolemia The nature of cardiac risk has been fully discussed with this patient.  I have made him aware of his LDL target goal given his cardiovascular risk analysis. I have discussed the appropriate diet. The need for lifelong compliance in order to reduce risk is stressed. A regular exercise program is recommended to help achieve and maintain normal body weight, fitness and improve lipid balance. The risks and benefits of medications were discussed. Last cholesterol labs reviewed with patient. Patient made aware to get liver checked every 6 months. Continue with  lipitor Continue baby aspirin 4. Medicare annual wellness visit, subsequent Wife is HCP. But no form. Gave pt info on it 5. Screening for depression 3 most recent PHQ Screens 3/21/2019 Little interest or pleasure in doing things Not at all Feeling down, depressed, irritable, or hopeless Not at all Total Score PHQ 2 0  
  
 
6. Advanced care planning/counseling discussion AD form given to patient. He is full code 7. Benign prostatic hyperplasia with urinary frequency PSA plateau. Should get another one to follow progression. Slight increase from 2017 to last year. Visit Vitals /70 (BP 1 Location: Right arm, BP Patient Position: Sitting) Pulse 74 Temp 97.6 °F (36.4 °C) Resp 16 Ht 5' 4.5\" (1.638 m) Wt 195 lb 9.6 oz (88.7 kg) SpO2 96% BMI 33.06 kg/m² Historical Data Past Medical History:  
Diagnosis Date  Benign essential hypertension  History of elevated PSA   
 2.4 (11/2012), 3.6 (1/2014), 2.1 (3/2015) evaluated by Dr. Irena Driscoll, repeat PSA 1.9 (2/21/14); 2.1 (3/16/15), 2.4 (3/11/16), 2.7 (9/2016) - referred back to Dr. Yobany Segovia  Hypercholesterolemia  Incomplete RBBB 1/10/14  Obesity  Prediabetes 5/13/14 Past Surgical History:  
Procedure Laterality Date  COLONOSCOPY N/A 11/16/2016 COLONOSCOPY performed by Christian Johnson MD at [address] [facility]X COLONOSCOPY  11/17/16 Dr. Gary Romero Outpatient Encounter Medications as of 3/21/2019 Medication Sig Dispense Refill  lisinopril (PRINIVIL, ZESTRIL) 10 mg tablet TAKE 1 TABLET BY MOUTH DAILY 90 Tab 3  
 atorvastatin (LIPITOR) 10 mg tablet TAKE 1 TABLET BY MOUTH EVERY NIGHT AT BEDTIME 90 Tab 3  psyllium seed-sucrose (METAMUCIL) powd Take  by mouth. Use twice daily  [DISCONTINUED] lisinopril (PRINIVIL, ZESTRIL) 10 mg tablet TAKE 1 TABLET BY MOUTH DAILY 90 Tab 3  [DISCONTINUED] atorvastatin (LIPITOR) 10 mg tablet TAKE 1 TABLET BY MOUTH EVERY NIGHT AT BEDTIME 90 Tab 3  ASPIRIN PO Take 81 mg by mouth daily. No facility-administered encounter medications on file as of 3/21/2019. Allergies Allergen Reactions  Flomax [Tamsulosin] Other (comments) Syncope Social History Socioeconomic History  Marital status:  Spouse name: Not on file  Number of children: Not on file  Years of education: Not on file  Highest education level: Not on file Occupational History  Not on file Social Needs  Financial resource strain: Not on file  Food insecurity:  
  Worry: Not on file Inability: Not on file  Transportation needs:  
  Medical: Not on file Non-medical: Not on file Tobacco Use  Smoking status: Never Smoker  Smokeless tobacco: Never Used Substance and Sexual Activity  Alcohol use: Yes Alcohol/week: 0.0 oz  
  Comment: rarely  Drug use: No  
 Sexual activity: Yes  
  Partners: Female Lifestyle  Physical activity:  
  Days per week: Not on file Minutes per session: Not on file  Stress: Not on file Relationships  Social connections:  
  Talks on phone: Not on file Gets together: Not on file Attends Restoration service: Not on file Active member of club or organization: Not on file Attends meetings of clubs or organizations: Not on file Relationship status: Not on file  Intimate partner violence: Fear of current or ex partner: Not on file Emotionally abused: Not on file Physically abused: Not on file Forced sexual activity: Not on file Other Topics Concern  Not on file Social History Narrative  Not on file  
  
 
family history includes Cancer in his brother and father; Diabetes in his mother; No Known Problems in his son; Stroke in his mother. Review of Systems Constitutional: Negative for chills, diaphoresis, fever, malaise/fatigue and weight loss. HENT: Negative for congestion, hearing loss and sore throat. Eyes: Negative for blurred vision and double vision. Respiratory: Negative for cough, shortness of breath and wheezing. Cardiovascular: Negative for chest pain, palpitations and leg swelling. Gastrointestinal: Negative for blood in stool and constipation. Genitourinary: Negative for dysuria, flank pain, frequency and urgency. Musculoskeletal: Positive for joint pain. Negative for myalgias. Skin: Negative for rash. Neurological: Negative for dizziness, weakness and headaches. Endo/Heme/Allergies: Does not bruise/bleed easily. Psychiatric/Behavioral: Negative for depression and memory loss. The patient does not have insomnia. Physical Exam  
Constitutional: He is oriented to person, place, and time. He appears well-developed and well-nourished. He is active. Non-toxic appearance. He does not have a sickly appearance. He does not appear ill. No distress. Eyes: Conjunctivae are normal. Right eye exhibits no discharge. Neck: Carotid bruit is not present. No thyroid mass and no thyromegaly present. Cardiovascular: Normal rate, regular rhythm, S1 normal, S2 normal, normal heart sounds and normal pulses. Exam reveals no gallop and no friction rub. Pulmonary/Chest: Effort normal and breath sounds normal. No respiratory distress. Abdominal: Soft. Bowel sounds are normal.  
Musculoskeletal: He exhibits no edema or deformity. Lymphadenopathy:  
  He has no cervical adenopathy. Neurological: He is alert and oriented to person, place, and time. Coordination normal.  
Skin: Skin is warm and dry. No rash noted. No pallor. Psychiatric: He has a normal mood and affect. His behavior is normal. Thought content normal.  
Vitals reviewed. Ortho Exam 
 
 
Orders Placed This Encounter  REFERRAL TO OPHTHALMOLOGY Referral Priority:   Routine Referral Type:   Consultation Referral Reason:   Specialty Services Required Referred to Provider:   Naz Keane MD  
 lisinopril (PRINIVIL, ZESTRIL) 10 mg tablet Sig: TAKE 1 TABLET BY MOUTH DAILY Dispense:  90 Tab Refill:  3  
 atorvastatin (LIPITOR) 10 mg tablet Sig: TAKE 1 TABLET BY MOUTH EVERY NIGHT AT BEDTIME Dispense:  90 Tab Refill:  3 I have reviewed the patient's medical history in detail and updated the computerized patient record. We had a prolonged discussion about these complex clinical issues and went over the various important aspects to consider. All questions were answered. Advised him to call back or return to office if symptoms do not improve, change in nature, or persist. 
 
He was given an after visit summary or informed of [employer] [facility] which includes patient instructions, diagnoses, current medications, & vitals. He expressed understanding with the diagnosis and plan. Walks a mile a day Roseanne Beauchamp is a 70 y.o. male and presents for annual Medicare Wellness Visit. Assessment of cognitive impairment: Alert and oriented x 3. Patient denies concerns about cognitive impairment. Problem List: Reviewed with patient and discussed risk factors. Patient Active Problem List  
Diagnosis Code  Incomplete RBBB I45.10  Hypercholesterolemia E78.00  Benign essential hypertension I10  
 Prediabetes R73.03  
 Obesity E66.9  
 History of elevated PSA Z87.898  
  Advanced care planning/counseling discussion Z71.89 Current medical providers:  Patient Care Team: 
Mary Champion MD as PCP - General (Internal Medicine) End of Life Planning: This was discussed with him today and he  has NO advanced directive  - add't info provided. Reviewed DNR/DNI and patient is not interested. Depression Screen: Reviewed PQH2 done by nurse. 3 most recent PHQ Screens 3/21/2019 Little interest or pleasure in doing things Not at all Feeling down, depressed, irritable, or hopeless Not at all Total Score PHQ 2 0 Fall Risk:  
Fall Risk Assessment, last 12 mths 3/21/2019 Able to walk? Yes Fall in past 12 months? No  
Fall with injury? -  
Number of falls in past 12 months - Fall Risk Score -  
 
 
Home Safety - discussion completed. No issues found. Hearing Loss: 
denies any hearing loss Activities of Daily Living: 
Self-care. Requires assistance with: no ADLs Adult Nutrition Screen: No risk factors noted. Health Maintenance- Reviewed AAA Screening: 
Glaucoma Screening:  
 
Health Maintenance Due Topic Date Due  Shingrix Vaccine Age 50> (1 of 2) 07/30/1999  GLAUCOMA SCREENING Q2Y  01/30/2017  Pneumococcal 65+ years (2 of 2 - PPSV23) 03/08/2018  MEDICARE YEARLY EXAM  03/21/2019 Health Maintenance reviewed -Plan:   
 
Orders Placed This Encounter  REFERRAL TO OPHTHALMOLOGY  lisinopril (PRINIVIL, ZESTRIL) 10 mg tablet  atorvastatin (LIPITOR) 10 mg tablet Plan:   
Diagnoses and all orders for this visit: 1. Prediabetes 
-     REFERRAL TO OPHTHALMOLOGY 2. Benign essential hypertension 3. Hypercholesterolemia 4. Medicare annual wellness visit, subsequent 5. Screening for depression 6. Advanced care planning/counseling discussion 7. Benign prostatic hyperplasia with urinary frequency Other orders 
-     lisinopril (PRINIVIL, ZESTRIL) 10 mg tablet; TAKE 1 TABLET BY MOUTH DAILY -     atorvastatin (LIPITOR) 10 mg tablet; TAKE 1 TABLET BY MOUTH EVERY NIGHT AT BEDTIME Orders Placed This Encounter  REFERRAL TO OPHTHALMOLOGY  lisinopril (PRINIVIL, ZESTRIL) 10 mg tablet  atorvastatin (LIPITOR) 10 mg tablet Follow-up and Dispositions · Return if symptoms worsen or fail to improve. Reviewed with patient the treatment plan, goals of treatment plan, and limitations of treatment plan, to include the potential for side effects from medications and procedures. If side effects occur, it is the responsibility of the patient to inform the clinic so that a change in the treatment plan can be made in a safe manner. The patient is advised that stopping prescribed medication may cause an increase in symptoms and possible medication withdrawal symptoms. The patient is informed an emergency room evaluation may be necessary if this occurs. Patient verbalized understanding and is in agreement with treatment plan as outlined above. All questions answered.

## 2019-03-27 DIAGNOSIS — N40.1 BENIGN PROSTATIC HYPERPLASIA WITH URINARY FREQUENCY: ICD-10-CM

## 2019-03-27 DIAGNOSIS — R35.0 BENIGN PROSTATIC HYPERPLASIA WITH URINARY FREQUENCY: ICD-10-CM

## 2019-03-27 NOTE — TELEPHONE ENCOUNTER
Last office visit 03/21/2019  Next office visit 09/11/2019 ()  ----- Message from Lisandra Pavon sent at 3/27/2019 12:39 PM EDT -----  Regarding: Mono Shi MD/ telephone  Pt states that [facility] sent him a message stating that the  tamsulosin has not been authorized Pt would need a refill sent to [id number] (information is on file) Pts contact [phone].

## 2019-03-28 RX ORDER — TAMSULOSIN HYDROCHLORIDE 0.4 MG/1
0.4 CAPSULE ORAL DAILY
Qty: 30 CAP | Refills: 5 | Status: SHIPPED | OUTPATIENT
Start: 2019-03-28 | End: 2019-08-03 | Stop reason: SDUPTHER

## 2019-07-01 ENCOUNTER — OFFICE VISIT (OUTPATIENT)
Dept: INTERNAL MEDICINE CLINIC | Age: 69
End: 2019-07-01

## 2019-07-01 VITALS
SYSTOLIC BLOOD PRESSURE: 142 MMHG | OXYGEN SATURATION: 93 % | RESPIRATION RATE: 17 BRPM | HEIGHT: 65 IN | HEART RATE: 78 BPM | DIASTOLIC BLOOD PRESSURE: 78 MMHG | TEMPERATURE: 98.6 F | BODY MASS INDEX: 33.26 KG/M2 | WEIGHT: 199.6 LBS

## 2019-07-01 DIAGNOSIS — R05.9 COUGH: Primary | ICD-10-CM

## 2019-07-01 RX ORDER — TAMSULOSIN HYDROCHLORIDE 0.4 MG/1
CAPSULE ORAL
Refills: 5 | COMMUNITY
Start: 2019-06-20 | End: 2019-08-05 | Stop reason: SDUPTHER

## 2019-07-01 NOTE — PROGRESS NOTES
Eddie Hernández is a 70 y.o. male    Chief Complaint   Patient presents with    Medication Evaluation     Lisinopril     1. Have you been to the ER, urgent care clinic since your last visit? Hospitalized since your last visit? No    2. Have you seen or consulted any other health care providers outside of the [address] [place] [address] since your last visit? Include any pap smears or colon screening.  No     Health Maintenance Due   Topic Date Due    Shingrix Vaccine Age 49> (1 of 2) 07/30/1999    GLAUCOMA SCREENING Q2Y  01/30/2017    Pneumococcal 65+ years (2 of 2 - PPSV23) 03/08/2018

## 2019-07-01 NOTE — PROGRESS NOTES
HISTORY OF PRESENT ILLNESS  Magali Haile is a 70 y.o. male. Patient reports dry cough for about 6 months in the morning only when he first wakes up. No fever, shortness of breath, or congestion. Patient takes zyrtec for allergy symptoms but not daily. Patient denies history of GERD. Patient concerned it may be due to his Lisinopril. Patient has been taking Lisinopril since 2014, but cough just started about 6 months ago and is not throughout the day. Has been tolerating medication well in the past.  Visit Vitals  /78 (BP 1 Location: Left arm, BP Patient Position: Sitting)   Pulse 78   Temp 98.6 °F (37 °C) (Oral)   Resp 17   Ht 5' 4.5\" (1.638 m)   Wt 199 lb 9.6 oz (90.5 kg)   SpO2 93%   BMI 33.73 kg/m²       HPI    Review of Systems   Respiratory: Positive for cough. Physical Exam   Constitutional: He is oriented to person, place, and time. He appears well-developed and well-nourished. HENT:   Head: Normocephalic. Right Ear: Hearing, tympanic membrane, external ear and ear canal normal.   Left Ear: Hearing, tympanic membrane, external ear and ear canal normal.   Nose: Mucosal edema (severe, worse on right; pale and boggy) present. Mouth/Throat: Uvula is midline, oropharynx is clear and moist and mucous membranes are normal.   Cardiovascular: Normal rate, regular rhythm and normal heart sounds. Pulmonary/Chest: Effort normal and breath sounds normal.   Neurological: He is alert and oriented to person, place, and time. Skin: Skin is warm and dry. Psychiatric: He has a normal mood and affect. ASSESSMENT and PLAN    ICD-10-CM ICD-9-CM    1.  Cough R05 786.2      Orders Placed This Encounter    tamsulosin (FLOMAX) 0.4 mg capsule   consider allergy cause- start zyrtec and flonase daily  Follow-up if no improvement in 2-4 weeks  May consider CXR/switching off lisinopril if no improvement with allergy meds

## 2019-07-29 RX ORDER — ATORVASTATIN CALCIUM 10 MG/1
TABLET, FILM COATED ORAL
Qty: 30 TAB | Refills: 1 | Status: SHIPPED | OUTPATIENT
Start: 2019-07-29 | End: 2019-08-21 | Stop reason: SDUPTHER

## 2019-07-29 RX ORDER — LISINOPRIL 10 MG/1
TABLET ORAL
Qty: 30 TAB | Refills: 1 | Status: SHIPPED | OUTPATIENT
Start: 2019-07-29 | End: 2019-09-13 | Stop reason: SDUPTHER

## 2019-07-29 NOTE — TELEPHONE ENCOUNTER
Last refill- 3/21/19  Last office visit - 3/21/19  Next office visit -   Future Appointments   Date Time Provider Shelbi Kasandra   9/13/2019  2:00 PM Natalee Conway MD [facility] SCHED         Requested Prescriptions     Pending Prescriptions Disp Refills    atorvastatin (LIPITOR) 10 mg tablet 30 Tab 1     Sig: TAKE 1 TABLET BY MOUTH EVERY NIGHT AT BEDTIME    lisinopril (PRINIVIL, ZESTRIL) 10 mg tablet 30 Tab 1     Sig: TAKE 1 TABLET BY MOUTH DAILY

## 2019-07-29 NOTE — TELEPHONE ENCOUNTER
Requested Prescriptions     Pending Prescriptions Disp Refills    atorvastatin (LIPITOR) 10 mg tablet 90 Tab 3     Sig: TAKE 1 TABLET BY MOUTH EVERY NIGHT AT BEDTIME    lisinopril (PRINIVIL, ZESTRIL) 10 mg tablet 90 Tab 3     Sig: TAKE 1 TABLET BY MOUTH DAILY   03/21/2019 09/13/2019 with  (new PCP)  Pharmacy cancelled  additional refills and told the patient we refused his refills.   [facility] pharmacy

## 2019-08-21 RX ORDER — ATORVASTATIN CALCIUM 10 MG/1
TABLET, FILM COATED ORAL
Qty: 30 TAB | Refills: 1 | Status: SHIPPED | OUTPATIENT
Start: 2019-08-21 | End: 2019-09-13 | Stop reason: SDUPTHER

## 2019-09-13 ENCOUNTER — OFFICE VISIT (OUTPATIENT)
Dept: FAMILY MEDICINE CLINIC | Age: 69
End: 2019-09-13

## 2019-09-13 ENCOUNTER — HOSPITAL ENCOUNTER (OUTPATIENT)
Dept: LAB | Age: 69
Discharge: HOME | End: 2019-09-13
Payer: MEDICARE

## 2019-09-13 VITALS
HEIGHT: 65 IN | RESPIRATION RATE: 16 BRPM | OXYGEN SATURATION: 96 % | SYSTOLIC BLOOD PRESSURE: 118 MMHG | HEART RATE: 63 BPM | DIASTOLIC BLOOD PRESSURE: 76 MMHG | BODY MASS INDEX: 32.99 KG/M2 | WEIGHT: 198 LBS | TEMPERATURE: 98 F

## 2019-09-13 DIAGNOSIS — Z87.898 HISTORY OF ELEVATED PSA: ICD-10-CM

## 2019-09-13 DIAGNOSIS — I10 BENIGN ESSENTIAL HYPERTENSION: Primary | ICD-10-CM

## 2019-09-13 DIAGNOSIS — R35.0 BENIGN PROSTATIC HYPERPLASIA WITH URINARY FREQUENCY: ICD-10-CM

## 2019-09-13 DIAGNOSIS — R73.03 PREDIABETES: ICD-10-CM

## 2019-09-13 DIAGNOSIS — Z23 ENCOUNTER FOR IMMUNIZATION: ICD-10-CM

## 2019-09-13 DIAGNOSIS — R05.3 PERSISTENT DRY COUGH: ICD-10-CM

## 2019-09-13 DIAGNOSIS — E78.00 HYPERCHOLESTEROLEMIA: ICD-10-CM

## 2019-09-13 DIAGNOSIS — N40.1 BENIGN PROSTATIC HYPERPLASIA WITH URINARY FREQUENCY: ICD-10-CM

## 2019-09-13 DIAGNOSIS — E66.9 OBESITY WITHOUT SERIOUS COMORBIDITY, UNSPECIFIED CLASSIFICATION, UNSPECIFIED OBESITY TYPE: ICD-10-CM

## 2019-09-13 PROCEDURE — 80048 BASIC METABOLIC PNL TOTAL CA: CPT

## 2019-09-13 PROCEDURE — 36415 COLL VENOUS BLD VENIPUNCTURE: CPT

## 2019-09-13 RX ORDER — LISINOPRIL 10 MG/1
TABLET ORAL
Qty: 90 TAB | Refills: 1 | Status: SHIPPED | OUTPATIENT
Start: 2019-09-13 | End: 2019-09-13 | Stop reason: SINTOL

## 2019-09-13 RX ORDER — ATORVASTATIN CALCIUM 10 MG/1
TABLET, FILM COATED ORAL
Qty: 90 TAB | Refills: 1 | Status: SHIPPED | OUTPATIENT
Start: 2019-09-13 | End: 2019-09-19 | Stop reason: SDUPTHER

## 2019-09-13 RX ORDER — LOSARTAN POTASSIUM 25 MG/1
25 TABLET ORAL DAILY
Qty: 90 TAB | Refills: 1 | Status: SHIPPED | OUTPATIENT
Start: 2019-09-13 | End: 2019-09-13 | Stop reason: SDUPTHER

## 2019-09-13 RX ORDER — TAMSULOSIN HYDROCHLORIDE 0.4 MG/1
CAPSULE ORAL
Qty: 90 CAP | Refills: 1 | Status: SHIPPED | OUTPATIENT
Start: 2019-09-13 | End: 2019-09-24 | Stop reason: SDUPTHER

## 2019-09-13 RX ORDER — LOSARTAN POTASSIUM 25 MG/1
25 TABLET ORAL DAILY
Qty: 90 TAB | Refills: 1 | Status: SHIPPED | OUTPATIENT
Start: 2019-09-13 | End: 2020-03-13

## 2019-09-13 NOTE — PATIENT INSTRUCTIONS
I think you should stop the lisinopril--it might be causing the cough  We will give you losartan instead  If the [facility] gets better [facility]    DASH Diet: Care Instructions  Your Care Instructions    The DASH diet is an eating plan that can help lower your blood pressure. DASH stands for Dietary Approaches to Stop Hypertension. Hypertension is high blood pressure. The DASH diet focuses on eating foods that are high in calcium, potassium, and magnesium. These nutrients can lower blood pressure. The foods that are highest in these nutrients are fruits, vegetables, low-fat dairy products, nuts, seeds, and legumes. But taking calcium, potassium, and magnesium supplements instead of eating foods that are high in those nutrients does not have the same effect. The DASH diet also includes whole grains, fish, and poultry. The DASH diet is one of several lifestyle changes your doctor may recommend to lower your high blood pressure. Your doctor may also want you to decrease the amount of sodium in your diet. Lowering sodium while following the DASH diet can lower blood pressure even further than just the DASH diet alone. Follow-up care is a key part of your treatment and safety. Be sure to make and go to all appointments, and call your doctor if you are having problems. It's also a good idea to know your test results and keep a list of the medicines you take. How can you care for yourself at home? Following the DASH diet  · Eat 4 to 5 servings of fruit each day. A serving is 1 medium-sized piece of fruit, ½ cup chopped or canned fruit, 1/4 cup dried fruit, or 4 ounces (½ cup) of fruit juice. Choose fruit more often than fruit juice. · Eat 4 to 5 servings of vegetables each day. A serving is 1 cup of lettuce or raw leafy vegetables, ½ cup of chopped or cooked vegetables, or 4 ounces (½ cup) of vegetable juice. Choose vegetables more often than vegetable juice.   · Get 2 to 3 servings of low-fat and fat-free dairy each day. A serving is 8 ounces of milk, 1 cup of yogurt, or 1 ½ ounces of cheese. · Eat 6 to 8 servings of grains each day. A serving is 1 slice of bread, 1 ounce of dry cereal, or ½ cup of cooked rice, pasta, or cooked cereal. Try to choose whole-grain products as much as possible. · Limit lean meat, poultry, and fish to 2 servings each day. A serving is 3 ounces, about the size of a deck of cards. · Eat 4 to 5 servings of nuts, seeds, and legumes (cooked dried beans, lentils, and split peas) each week. A serving is 1/3 cup of nuts, 2 tablespoons of seeds, or ½ cup of cooked beans or peas. · Limit fats and oils to 2 to 3 servings each day. A serving is 1 teaspoon of vegetable oil or 2 tablespoons of salad dressing. · Limit sweets and added sugars to 5 servings or less a week. A serving is 1 tablespoon jelly or jam, ½ cup sorbet, or 1 cup of lemonade. · Eat less than 2,300 milligrams (mg) of sodium a day. If you limit your sodium to 1,500 mg a day, you can lower your blood pressure even more. Tips for success  · Start small. Do not try to make dramatic changes to your diet all at once. You might feel that you are missing out on your favorite foods and then be more likely to not follow the plan. Make small changes, and stick with them. Once those changes become habit, add a few more changes. · Try some of the following:  ? Make it a goal to eat a fruit or vegetable at every meal and at snacks. This will make it easy to get the recommended amount of fruits and vegetables each day. ? Try yogurt topped with fruit and nuts for a snack or healthy dessert. ? Add lettuce, tomato, cucumber, and onion to sandwiches. ? Combine a ready-made pizza crust with low-fat mozzarella cheese and lots of vegetable toppings. Try using tomatoes, squash, spinach, broccoli, carrots, cauliflower, and onions. ? Have a variety of cut-up vegetables with a low-fat dip as an appetizer instead of chips and dip. ?  Sprinkle sunflower seeds or chopped almonds over salads. Or try adding chopped walnuts or almonds to cooked vegetables. ? Try some vegetarian meals using beans and peas. Add garbanzo or kidney beans to salads. Make burritos and tacos with mashed rodriguez beans or black beans. Where can you learn more? Go to http://jw-ryan.info/. Enter I389 in the search box to learn more about \"DASH Diet: Care Instructions. \"  Current as of: July 22, 2018  Content Version: 12.1  © 4763-6745 [employer]. Care instructions adapted under license by [employer] (which disclaims liability or warranty for this information). If you have questions about a medical condition or this instruction, always ask your healthcare professional. [address] any warranty or liability for your use of this information.

## 2019-09-13 NOTE — PROGRESS NOTES
Quinn Phipps [facility]  [id number] [place] [address] [address], [address]  [phone]             Date of visit: 9/13/2019   Subjective:      History obtained from:  the patient. Jessica Peralta is a 78 y.o. male who presents today for new patient, used to see Dr. Sachi Diez  Rarely checks bp but takes his med  Remembers his pills well  Walks for exercise  Limits sodium  Eats well, plenty of veggies    flomax working for prostate pretty well  Saw urologist 3-4 years ago    Uses zyrtec prn for allergies and thinks it makes his prostate worse  Cough better on zyrtec, hardly coughs now    Tolerating all his meds  On statin as well    No longer on aspirin and no indication    Patient Active Problem List    Diagnosis Date Noted    Benign prostatic hyperplasia with lower urinary tract symptoms 09/13/2019    Benign prostatic hyperplasia with urinary frequency 09/13/2019    Advanced care planning/counseling discussion 03/08/2017    History of elevated PSA     Obesity 03/16/2015    Prediabetes 05/13/2014    Benign essential hypertension 03/14/2014    Hypercholesterolemia     Incomplete RBBB 01/10/2014     Current Outpatient Medications   Medication Sig Dispense Refill    atorvastatin (LIPITOR) 10 mg tablet TAKE 1 TABLET BY MOUTH EVERYDAY AT BEDTIME 90 Tab 1    tamsulosin (FLOMAX) 0.4 mg capsule TAKE 1 CAPSULE BY MOUTH EVERY DAY 90 Cap 1    losartan (COZAAR) 25 mg tablet Take 1 Tab by mouth daily. Replaces lisinopril 90 Tab 1    psyllium seed-sucrose (METAMUCIL) powd Take  by mouth.  Use twice daily       Allergies   Allergen Reactions    Flomax [Tamsulosin] Other (comments)     Syncope       Past Medical History:   Diagnosis Date    Benign essential hypertension     History of elevated PSA     2.4 (11/2012), 3.6 (1/2014), 2.1 (3/2015) evaluated by Dr. Noland Route, repeat PSA 1.9 (2/21/14); 2.1 (3/16/15), 2.4 (3/11/16), 2.7 (9/2016) - referred back to Dr. Tameka Romano Hypercholesterolemia     Incomplete RBBB 1/10/14    Obesity     Prediabetes 5/13/14     Past Surgical History:   Procedure Laterality Date    COLONOSCOPY N/A 11/16/2016    [facility]COPY    HX COLONOSCOPY  11/17/16    Dr. 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 General: stated age,obese, and in NAD  Eyes: PERRL, EOMI, no redness or drainage  Nose: no drainage  Mouth: no lesions  Throat: no erythema, exudate or swelling  Neck: supple, symmetrical, trachea midline, no adenopathy and thyroid: not enlarged, symmetric, no tenderness/mass/nodules  Lungs:  clear to auscultation w/o rales, rhonchi, wheezes w/normal effort and no use of accessory muscles of respiration   Heart: regular rate and rhythm, S1, S2 normal, no murmur, click, rub or gallop  Abdomen: soft, nontender, no masses  Ext:  No edema noted.    Lymph: no cervical adenopathy appreciated  Skin:  Normal. and no rash or abnormalities   Neuro: normal gait, CN 2-12 intact  Psych: alert and oriented to person, place, time and situation and Speech: appropriate quality, quantity and organization of sentences and normal affect    Assessment/Plan:       ICD-10-CM ICD-9-CM    1. Benign essential hypertension I61 847.4 METABOLIC PANEL, BASIC   2. Hypercholesterolemia E78.00 272.0    3. Obesity without serious comorbidity, unspecified classification, unspecified obesity type E66.9 278.00    4. Prediabetes R73.03 790.29    5. Benign prostatic hyperplasia with urinary frequency N40.1 600.01 tamsulosin (FLOMAX) 0.4 mg capsule    R35.0 788.41    6. Persistent dry cough R05 786.2    7. History of elevated PSA Z87.898 V13.89    8. Encounter for immunization Z23 V03.89 INFLUENZA VACCINE INACTIVATED (IIV), SUBUNIT, ADJUVANTED, IM      ADMIN INFLUENZA VIRUS VAC        Orders Placed This Encounter    Influenza Vaccine Inactivated (IIV) (FLUAD), Subunit, Adjuvanted, IM (77009)    METABOLIC PANEL, BASIC    ADMIN INFLUENZA VIRUS VAC    atorvastatin (LIPITOR) 10 mg tablet    tamsulosin (FLOMAX) 0.4 mg capsule    DISCONTD: lisinopril (PRINIVIL, ZESTRIL) 10 mg tablet    DISCONTD: losartan (COZAAR) 25 mg tablet    losartan (COZAAR) 25 mg tablet       Chronic problems stable  Changing lisinopril to losartan due to persistent dry cough  Zyrtec has helped some, but he never had other allergy symptoms  Bmp for med monitoring, hypertension  Prostate symptoms controlled on flomax  Metamucil preventing constipation  Flu shot today  Declines shingrix as he already had zostavax  Declines pneumovax, really thinks he already had both pneumonia shots, I just don't have record    Discussed the diagnosis and plan and he expressed understanding. Follow-up and Dispositions    · Return in about 6 months (around 3/13/2020) for Annual Wellness Visit.       Patient Instructions   I think you should stop the lisinopril--it might be causing the cough  We will give you losartan instead  If the cough gets better you can stop the Candy Perez MD

## 2019-09-13 NOTE — PROGRESS NOTES
Chief Complaint   Patient presents with    Establish Care     new patient    Hypertension    Cholesterol Problem    Immunization/Injection     Influenza High dose      1. Have you been to the ER, urgent care clinic since your last visit? Hospitalized since your last visit? No    2. Have you seen or consulted any other health care providers outside of the [address] [place] [address] since your last visit? Include any pap smears or colon screening.    No

## 2019-09-14 LAB
BUN SERPL-MCNC: 14 MG/DL (ref 8–27)
BUN/CREAT SERPL: 13 (ref 10–24)
CALCIUM SERPL-MCNC: 9.2 MG/DL (ref 8.6–10.2)
CHLORIDE SERPL-SCNC: 103 MMOL/L (ref 96–106)
CO2 SERPL-SCNC: 20 MMOL/L (ref 20–29)
CREAT SERPL-MCNC: 1.04 MG/DL (ref 0.76–1.27)
GLUCOSE SERPL-MCNC: 91 MG/DL (ref 65–99)
POTASSIUM SERPL-SCNC: 4.5 MMOL/L (ref 3.5–5.2)
SODIUM SERPL-SCNC: 140 MMOL/L (ref 134–144)

## 2019-09-19 ENCOUNTER — TELEPHONE (OUTPATIENT)
Dept: FAMILY MEDICINE CLINIC | Age: 69
End: 2019-09-19

## 2019-09-19 RX ORDER — ATORVASTATIN CALCIUM 10 MG/1
TABLET, FILM COATED ORAL
Qty: 90 TAB | Refills: 1 | Status: SHIPPED | OUTPATIENT
Start: 2019-09-19 | End: 2020-03-13

## 2019-09-19 NOTE — TELEPHONE ENCOUNTER
PCP: Dinah Solorzano MD    Last appt: 9/13/2019  Future Appointments   Date Time Provider Shelbi Slaughter   3/17/2020  9:00 AM Dinah Solorzano MD [facility]       Requested Prescriptions     Pending Prescriptions Disp Refills    atorvastatin (LIPITOR) 10 mg tablet 90 Tab 1     Sig: TAKE 1 TABLET BY MOUTH EVERYDAY AT BEDTIME

## 2019-09-19 NOTE — TELEPHONE ENCOUNTER
PT came into to the office to request a 90 day supply for the following Rx: . Kristin De Los Santos   Requested Prescriptions     Pending Prescriptions Disp Refills    atorvastatin (LIPITOR) 10 mg tablet 90 Tab 1     Sig: TAKE 1 TABLET BY MOUTH EVERYDAY AT BEDTIME     Best contact# [phone]  LOV 09/13/19      [facility]/pharmacy #[id number]- [place], [place] - [address] [facility]  [phone]

## 2020-01-29 ENCOUNTER — TELEPHONE (OUTPATIENT)
Dept: FAMILY MEDICINE CLINIC | Age: 70
End: 2020-01-29

## 2020-01-29 NOTE — TELEPHONE ENCOUNTER
Called pt to let him that he had seen Dr Mesfin Rick in the past.  He said that he doesn't think that the flomax isn't working any more. When he sits for any period of time and when he gets up he has to go urinate. Advised that he not stop medication without seeing Dr Mela Gustafson.

## 2020-01-29 NOTE — TELEPHONE ENCOUNTER
----- Message from Megan Lainez sent at 1/29/2020  9:15 AM EST -----  Regarding: Dr. Deepti Keating    Patient is requesting a name for a urologist. Kimberly Cox contact number is [phone].      LOV: 9/13/19

## 2020-06-08 RX ORDER — ATORVASTATIN CALCIUM 10 MG/1
TABLET, FILM COATED ORAL
Qty: 90 TAB | Refills: 0 | Status: SHIPPED | OUTPATIENT
Start: 2020-06-08 | End: 2020-09-02

## 2020-06-08 RX ORDER — LOSARTAN POTASSIUM 25 MG/1
TABLET ORAL
Qty: 90 TAB | Refills: 0 | Status: SHIPPED | OUTPATIENT
Start: 2020-06-08 | End: 2020-09-02

## 2020-06-22 NOTE — PATIENT INSTRUCTIONS
Well Visit, Over 72: Care Instructions Your Care Instructions Physical exams can help you stay healthy. Your doctor has checked your overall health and may have suggested ways to take good care of yourself. He or she also may have recommended tests. At home, you can help prevent illness with healthy eating, regular exercise, and other steps. Follow-up care is a key part of your treatment and safety. Be sure to make and go to all appointments, and call your doctor if you are having problems. It's also a good idea to know your test results and keep a list of the medicines you take. How can you care for yourself at home? · Reach and stay at a healthy weight. This will lower your risk for many problems, such as obesity, diabetes, heart disease, and high blood pressure. · Get at least 30 minutes of exercise on most days of the week. Walking is a good choice. You also may want to do other activities, such as running, swimming, cycling, or playing tennis or team sports. · Do not smoke. Smoking can make health problems worse. If you need help quitting, talk to your doctor about stop-smoking programs and medicines. These can increase your chances of quitting for good. · Protect your skin from too much sun. When you're outdoors from 10 a.m. to 4 p.m., stay in the shade or cover up with clothing and a hat with a wide brim. Wear sunglasses that block UV rays. Even when it's cloudy, put broad-spectrum sunscreen (SPF 30 or higher) on any exposed skin. · See a dentist one or two times a year for checkups and to have your teeth cleaned. · Wear a seat belt in the car. Follow your doctor's advice about when to have certain tests. These tests can spot problems early. For men and women · Cholesterol. Your doctor will tell you how often to have this done based on your overall health and other things that can increase your risk for heart attack and stroke. · Blood pressure. Have your blood pressure checked during a routine doctor visit. Your doctor will tell you how often to check your blood pressure based on your age, your blood pressure results, and other factors. · Diabetes. Ask your doctor whether you should have tests for diabetes. · Vision. Experts recommend that you have yearly exams for glaucoma and other age-related eye problems. · Hearing. Tell your doctor if you notice any change in your hearing. You can have tests to find out how well you hear. · Colon cancer tests. Keep having colon cancer tests as your doctor recommends. You can have one of several types of tests. · Heart attack and stroke risk. At least every 4 to 6 years, you should have your risk for heart attack and stroke assessed. Your doctor uses factors such as your age, blood pressure, cholesterol, and whether you smoke or have diabetes to show what your risk for a heart attack or stroke is over the next 10 years. · Osteoporosis. Talk to your doctor about whether you should have a bone density test to find out whether you have thinning bones. Ask your doctor if you need to take a calcium plus vitamin D supplement. You may be able to get enough calcium and vitamin D through your diet. For women · Pap test and pelvic exam. You may no longer need a Pap test. Talk with your doctor about whether to stop or continue to have Pap tests. · Breast exam and mammogram. Ask how often you should have a mammogram, which is an X-ray of your breasts. A mammogram can spot breast cancer before it can be felt and when it is easiest to treat. · Thyroid disease. Talk to your doctor about whether to have your thyroid checked as part of a regular physical exam. Women have an increased chance of a thyroid problem. For men · Prostate exam. Talk to your doctor about whether you should have a blood test (called a PSA test) for prostate cancer.  Experts recommend that you discuss the benefits and risks of the test with your doctor before you decide whether to have this test. Some experts say that men ages 79 and older no longer need testing. · Abdominal aortic aneurysm. Ask your doctor whether you should have a test to check for an aneurysm. You may need a test if you ever smoked or if your parent, brother, sister, or child has had an aneurysm. When should you call for help? Watch closely for changes in your health, and be sure to contact your doctor if you have any problems or symptoms that concern you. Where can you learn more? Go to http://jwThe [employer]ched Cottage Pharm[employer] Groupryan.info/ Enter Z066 in the search box to learn more about \"Well Visit, Over 65: Care Instructions. \" Current as of: August 22, 2019               Content Version: 12.5 © 7043-9807 [employer]wise, [employer]orporated. Care instructions adapted under license by [employer] (which disclaims liability or warranty for this information). If you have questions about a medical condition or this instruction, always ask your healthcare professional. [address] any warranty or liability for your use of this information.

## 2020-06-22 NOTE — PROGRESS NOTES
Quinn Phipps [facility]  [place]  [phone]             Date of visit: 6/23/2020       This is a Subsequent Medicare Annual Wellness Visit (AWV), (Performed more than 12 months after effective date of Medicare Part B enrollment and 12 months after last preventive visit)    I have reviewed the patient's medical history in detail and updated the computerized patient record. Dannie Adams is a 78 y.o. male   History obtained from: the patient. Concerns today   (Patient understands that medical problems addressed today may incur additional cost as this is a preventive visit)    This service was provided through telehealth (doxy. me real-time video/audio) due to COVID-19 pandemic, with the patient being at home and the provider being at [facility] in [facility]. Others assisting in the telehealth encounter included none. 20 minutes were spent with the patient by the provider. he  and/or his healthcare decision maker is aware that this patient-initiated Telehealth encounter is a billable service, with coverage as determined by his insurance carrier. he  is aware that he  may receive a bill and has provided verbal consent to proceed: Yes, per PSR.      -feeling well overall  -doing ok, getting out of the house at times to play golf or go to store or restaurant drive through    We had changed lisinopril to losartan due to cough  He thinks that helped a lot  Does think it makes him have more bowel movements but not diarrhea and no blood  Does take metamucil    Blood pressure good when checked today     flomax he stopped as he didn't think it was helping and just wasn't a big problem  Like last night only urinated once, which is typical  Will go more if he drinks too much  Tries to limit fluids in evening  Saw urologist 3-4 years ago  Has had PSA testing yearly for a while, was normal last year      On statin and no problems with that    Weight the same    History     Patient Active Problem List   Diagnosis Code    Incomplete RBBB I45.10    Hypercholesterolemia E78.00    Benign essential hypertension I10    Prediabetes R73.03    Obesity E66.9    History of elevated PSA Z87.898    Advanced care planning/counseling discussion Z71.89    Benign prostatic hyperplasia with lower urinary tract symptoms N40.1    Benign prostatic hyperplasia with urinary frequency N40.1, R35.0     Past Medical History:   Diagnosis Date    Benign essential hypertension     History of elevated PSA     2.4 (11/2012), 3.6 (1/2014), 2.1 (3/2015) evaluated by Dr. Jenny Campoverde, repeat PSA 1.9 (2/21/14); 2.1 (3/16/15), 2.4 (3/11/16), 2.7 (9/2016) - referred back to Dr. Tucker Fenton Hypercholesterolemia     Incomplete RBBB 1/10/14    Obesity     Prediabetes 5/13/14      Past Surgical History:   Procedure Laterality Date    COLONOSCOPY N/A 11/16/2016    COL[facility] performed by Po Borges MD at [facility] ENDOSCOPY    HX COLONOSCOPY  11/17/16    Dr. Mellie Felty     No Active Allergies  Current Outpatient Medications   Medication Sig Dispense Refill    losartan (COZAAR) 25 mg tablet TAKE 1 TABLET BY MOUTH EVERY DAY 90 Tab 0    atorvastatin (LIPITOR) 10 mg tablet TAKE 1 TABLET BY MOUTH EVERYDAY AT BEDTIME 90 Tab 0    psyllium seed-sucrose (METAMUCIL) powd Take  by mouth. Use twice daily       Family History   Problem Relation Age of Onset    Stroke Mother     Diabetes Mother     Cancer Father         colon    Cancer Brother         bone    No Known Problems Son      Social History     Tobacco Use    Smoking status: Never Smoker    Smokeless tobacco: Never Used   Substance Use Topics    Alcohol use:  Yes     Alcohol/week: 0.0 standard drinks     Comment: rarely       Specialists/Care Team   Radha Disla has established care with the following healthcare providers:  Patient Care Team:  Leah Avendaño MD as PCP - General (Family Practice)  Shana Villegas Melida Horan MD as PCP - [facility] Empaneled Provider  Martin Izquierdo MD (Dermatology)  Mayda Jerry MD (Ophthalmology)    Health Risk Assessment     Demographics   male  78 y.o. General Health Questions   -During the past 4 weeks:   -how would you rate your health in general? Good   -how often have you been bothered by feeling dizzy when standing up? never   -how much have you been bothered by bodily pain? not   -Have you noticed any hearing difficulties? Not as good as it used to be   -has your physical and emotional health limited your social activities with family or friends? no    Emotional Health Questions   -Do you have a history of depression, anxiety, or emotional problems? no    3 most recent PHQ Screens 6/23/2020   Little interest or pleasure in doing things Not at all   Feeling down, depressed, irritable, or hopeless Not at all   Total Score PHQ 2 0       Health Habits   Please describe your diet habits: fruits, veggies and chicken/fish, eats well  Do you get 5 servings of fruits or vegetables daily? Not quite  Do you exercise regularly? Walks daily in neighborhood and also plays golf    Activities of Daily Living and Functional Status   -Do you need help with eating, walking, dressing, bathing, toileting, the phone, transportation, shopping, preparing meals, housework, laundry, medications or managing money? no  -In the past four weeks, was someone available to help you if you needed and wanted help with anything? Yes, wife  -Are you confident are you that you can control and manage most of your health problems? yes  -Have you been given information to help you keep track of your medications? yes  -How often do you have trouble taking your medications as prescribed? never    Fall Risk and Home Safety   Have you fallen 2 or more times in the past year? no  Does your home have rugs in the hallway, lack grab bars in the bathroom, lack handrails on the stairs or have poor lighting?  No grab bars but doesn't need them  Do you have smoke detectors and check them regularly? yes  Do you have difficulties driving a car? no  Do you always fasten your seat belt when you are in a car? yes    Review of Systems (if indicated for problems addressed today)   Card: denies chest pain  Pulm: denies shortness of breath     Physical Examination   There were no vitals filed for this visit. There is no height or weight on file to calculate BMI. No exam data present   bp today 141/77    Was the patient's timed Up & Go test unsteady or longer than 30 seconds?  Didn't seem to be on video    Evaluation of Cognitive Function   Mood/affect:  neutral  Orientation: normal  Appearance: age appropriate  Family member/caregiver input: none    Additional exam if indicated for problems addressed today:  General: stated age, obese and in NAD  Skin:  Normal on video  Psych: alert and oriented to person, place, time and situation and Speech: appropriate quality, quantity and organization of sentences     Advice/Referrals/Counseling (as indicated)   Education and counseling provided for any problems identified above: diet, exercise    Preventive Services     Health Maintenance   Topic Date Due    GLAUCOMA SCREENING Q2Y  01/30/2017    A1C test (Diabetic or Prediabetic)  03/01/2020    Lipid Screen  03/01/2020    Medicare Yearly Exam  03/21/2020    Shingrix Vaccine Age 50> (1 of 2) 12/19/2021 (Originally 7/30/1999)    Influenza Age 5 to Adult  08/01/2020    DTaP/Tdap/Td series (2 - Td) 01/10/2024    Colonoscopy  11/16/2026    Hepatitis C Screening  Completed    Pneumococcal 65+ years  Completed     (Preventive care checklist to be included in patient instructions)  Discussed today Done Previously Not Needed     X both  Pneumococcal vaccines    x  Flu vaccine     x Hepatitis B vaccine (if at risk)    X Zostavax (declines shingrix)  Shingles vaccine    X 2014  TDAP vaccine     x Digital rectal exam    X 2019  PSA    Due 2026  Colorectal cancer screening     x Low-dose CT for lung cancer screening     x Bone density test   rescheduled for August X last year  Glaucoma screening   x   Cholesterol test   x   Diabetes screening test      x Diabetes self-management class     x Nutritionist referral for diabetes or renal disease     Discussion of Advance Directive   Discussed with Rosie Bean his ability to prepare and advance directive in the case that an injury or illness causes him to be unable to make health care decisions. Has one, offered to put it on his file    Assessment/Plan   Z00.00    ICD-10-CM ICD-9-CM    1. Medicare annual wellness visit, subsequent Z00.00 V70.0    2. Benign essential hypertension I10 401.1 CBC WITH AUTOMATED DIFF   3. Hypercholesterolemia N66.02 496.6 METABOLIC PANEL, COMPREHENSIVE      LIPID PANEL   4. Obesity without serious comorbidity, unspecified classification, unspecified obesity type E66.9 278.00    5. Impaired fasting glucose R73.01 790.21 HEMOGLOBIN A1C WITH EAG   6. Benign prostatic hyperplasia with lower urinary tract symptoms, symptom details unspecified N40.1 600.01      Preventive as above  bp fairly well controlled  No change to meds  Come in for labs  Tolerating meds  Stopped the flomax but not missing it  Discussed repeating psa every 2 years  Encouraged continued walking, eating veggies  Be careful for heat injury this summer; walk in am    Orders Placed This Encounter    CBC WITH AUTOMATED DIFF    METABOLIC PANEL, COMPREHENSIVE    LIPID PANEL    HEMOGLOBIN A1C WITH EAG       Follow-up and Dispositions    · Return in about 6 months (around 12/23/2020) for Follow up.           Colleen Hairston MD

## 2020-06-23 ENCOUNTER — VIRTUAL VISIT (OUTPATIENT)
Dept: FAMILY MEDICINE CLINIC | Age: 70
End: 2020-06-23

## 2020-06-23 DIAGNOSIS — I10 BENIGN ESSENTIAL HYPERTENSION: ICD-10-CM

## 2020-06-23 DIAGNOSIS — Z00.00 MEDICARE ANNUAL WELLNESS VISIT, SUBSEQUENT: Primary | ICD-10-CM

## 2020-06-23 DIAGNOSIS — N40.1 BENIGN PROSTATIC HYPERPLASIA WITH LOWER URINARY TRACT SYMPTOMS, SYMPTOM DETAILS UNSPECIFIED: ICD-10-CM

## 2020-06-23 DIAGNOSIS — E78.00 HYPERCHOLESTEROLEMIA: ICD-10-CM

## 2020-06-23 DIAGNOSIS — E66.9 OBESITY WITHOUT SERIOUS COMORBIDITY, UNSPECIFIED CLASSIFICATION, UNSPECIFIED OBESITY TYPE: ICD-10-CM

## 2020-06-23 DIAGNOSIS — R73.01 IMPAIRED FASTING GLUCOSE: ICD-10-CM

## 2020-06-24 ENCOUNTER — HOSPITAL ENCOUNTER (OUTPATIENT)
Dept: LAB | Age: 70
Discharge: HOME | End: 2020-06-24
Payer: MEDICARE

## 2020-06-24 PROCEDURE — 83036 HEMOGLOBIN GLYCOSYLATED A1C: CPT

## 2020-06-24 PROCEDURE — 80053 COMPREHEN METABOLIC PANEL: CPT

## 2020-06-24 PROCEDURE — 85025 COMPLETE CBC W/AUTO DIFF WBC: CPT

## 2020-06-24 PROCEDURE — 80061 LIPID PANEL: CPT

## 2020-06-25 LAB
ALBUMIN SERPL-MCNC: 4.7 G/DL (ref 3.8–4.8)
ALBUMIN/GLOB SERPL: 1.7 {RATIO} (ref 1.2–2.2)
ALP SERPL-CCNC: 76 IU/L (ref 39–117)
ALT SERPL-CCNC: 45 IU/L (ref 0–44)
AST SERPL-CCNC: 29 IU/L (ref 0–40)
BASOPHILS # BLD AUTO: 0.1 X10E3/UL (ref 0–0.2)
BASOPHILS NFR BLD AUTO: 1 %
BILIRUB SERPL-MCNC: 0.6 MG/DL (ref 0–1.2)
BUN SERPL-MCNC: 11 MG/DL (ref 8–27)
BUN/CREAT SERPL: 13 (ref 10–24)
CALCIUM SERPL-MCNC: 9.2 MG/DL (ref 8.6–10.2)
CHLORIDE SERPL-SCNC: 105 MMOL/L (ref 96–106)
CHOLEST SERPL-MCNC: 188 MG/DL (ref 100–199)
CO2 SERPL-SCNC: 21 MMOL/L (ref 20–29)
CREAT SERPL-MCNC: 0.85 MG/DL (ref 0.76–1.27)
EOSINOPHIL # BLD AUTO: 0.3 X10E3/UL (ref 0–0.4)
EOSINOPHIL NFR BLD AUTO: 4 %
ERYTHROCYTE [DISTWIDTH] IN BLOOD BY AUTOMATED COUNT: 12.8 % (ref 11.6–15.4)
EST. AVERAGE GLUCOSE BLD GHB EST-MCNC: 126 MG/DL
GLOBULIN SER CALC-MCNC: 2.7 G/DL (ref 1.5–4.5)
GLUCOSE SERPL-MCNC: 85 MG/DL (ref 65–99)
HBA1C MFR BLD: 6 % (ref 4.8–5.6)
HCT VFR BLD AUTO: 42.9 % (ref 37.5–51)
HDLC SERPL-MCNC: 41 MG/DL
HGB BLD-MCNC: 14.5 G/DL (ref 13–17.7)
IMM GRANULOCYTES # BLD AUTO: 0 X10E3/UL (ref 0–0.1)
IMM GRANULOCYTES NFR BLD AUTO: 0 %
INTERPRETATION, 910389: NORMAL
LDLC SERPL CALC-MCNC: 102 MG/DL (ref 0–99)
LYMPHOCYTES # BLD AUTO: 1.6 X10E3/UL (ref 0.7–3.1)
LYMPHOCYTES NFR BLD AUTO: 26 %
MCH RBC QN AUTO: 30.3 PG (ref 26.6–33)
MCHC RBC AUTO-ENTMCNC: 33.8 G/DL (ref 31.5–35.7)
MCV RBC AUTO: 90 FL (ref 79–97)
MONOCYTES # BLD AUTO: 0.4 X10E3/UL (ref 0.1–0.9)
MONOCYTES NFR BLD AUTO: 6 %
NEUTROPHILS # BLD AUTO: 3.8 X10E3/UL (ref 1.4–7)
NEUTROPHILS NFR BLD AUTO: 63 %
PLATELET # BLD AUTO: 257 X10E3/UL (ref 150–450)
POTASSIUM SERPL-SCNC: 4.1 MMOL/L (ref 3.5–5.2)
PROT SERPL-MCNC: 7.4 G/DL (ref 6–8.5)
RBC # BLD AUTO: 4.79 X10E6/UL (ref 4.14–5.8)
SODIUM SERPL-SCNC: 142 MMOL/L (ref 134–144)
TRIGL SERPL-MCNC: 226 MG/DL (ref 0–149)
VLDLC SERPL CALC-MCNC: 45 MG/DL (ref 5–40)
WBC # BLD AUTO: 6.1 X10E3/UL (ref 3.4–10.8)

## 2020-09-02 RX ORDER — ATORVASTATIN CALCIUM 10 MG/1
TABLET, FILM COATED ORAL
Qty: 90 TAB | Refills: 0 | Status: SHIPPED | OUTPATIENT
Start: 2020-09-02 | End: 2020-11-24 | Stop reason: SDUPTHER

## 2020-09-02 RX ORDER — LOSARTAN POTASSIUM 25 MG/1
TABLET ORAL
Qty: 90 TAB | Refills: 0 | Status: SHIPPED | OUTPATIENT
Start: 2020-09-02 | End: 2020-11-24 | Stop reason: DRUGHIGH

## 2020-11-23 NOTE — PROGRESS NOTES
[facility]inn Phipp[facility] Jeaneth. Rafaela, [address]  [phone]             Date of visit: 11/24/2020   Subjective:      History obtained from:  the patient. Prabhakar Hatfield is a 69 y.o. male who presents today for   Chief Complaint   Patient presents with    Hypertension    Cholesterol Problem     Feeling well overall    HTN on losartan and remembers it regularly  Doesn't check bp at home but could start  bp high today and wondering if due to taking omeprazole yesterday for indigestion    HLD on lipitor and good last check    Has mild prediabetes with a1c 6.0 in JUne  Weight was 198 last year, now 203  Eating well, usually chicken and fish (the ribs yesterday was an unusual meal for him). Apple per day. Some veggies  Exercising by walking daily  Has tried Weight Watchers in the past, didn't work    BPH used to be on flomax, still not really missing it  Might get up once at night  Almost 2 years since last PSA     Last eye visit with Dr. Barbara Foley 1.5 years ago  He retired  Flu shot already [place] 15      Patient Active Problem List    Diagnosis Date Noted    Benign prostatic hyperplasia with lower urinary tract symptoms 09/13/2019    Benign prostatic hyperplasia with urinary frequency 09/13/2019    Advanced care planning/counseling discussion 03/08/2017    History of elevated PSA     Obesity 03/16/2015    Prediabetes 05/13/2014    Benign essential hypertension 03/14/2014    Hypercholesterolemia     Incomplete RBBB 01/10/2014     Current Outpatient Medications   Medication Sig Dispense Refill    losartan (COZAAR) 50 mg tablet Take 1 Tab by mouth daily. increased dose 90 Tab 1    atorvastatin (LIPITOR) 10 mg tablet Take 1 Tab by mouth daily. 90 Tab 3    psyllium seed-sucrose (METAMUCIL) powd Take  by mouth.  Use twice daily       No Active Allergies  Past Medical History:   Diagnosis Date    Benign essential hypertension     History of elevated PSA     2.4 (11/2012), 3.6 (1/2014), 2.1 (3/2015) evaluated by Dr. Nikunj Mccoy, repeat PSA 1.9 (2/21/14); 2.1 (3/16/15), 2.4 (3/11/16), 2.7 (9/2016) - referred back to Dr. Santa Godinez Hypercholesterolemia     Incomplete RBBB 1/10/14    Obesity     Prediabetes 5/13/14     Past Surgical History:   Procedure Laterality Date    COLONOSCOPY N/A 11/16/2016    [facility] ENDOSCOPY    HX COLONOSCOPY  11/17/16    Dr. 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 General: stated age, obese and in NAD  Ears: TMs clear  Neck: supple, symmetrical, trachea midline, no adenopathy and thyroid: not enlarged, symmetric, no tenderness/mass/nodules  Lungs:  clear to auscultation w/o rales, rhonchi, wheezes w/normal effort and no use of accessory muscles of respiration   Heart: regular rate and rhythm, S1, S2 normal, no murmur, click, rub or gallop  Abdomen: soft, nontender, no masses  Ext:  No edema noted.    Lymph: no cervical adenopathy appreciated  Skin:  Normal. and no rash or abnormalities   Psych: alert and oriented to person, place, time and situation and Speech: appropriate quality, quantity and organization of sentences     Assessment/Plan:       ICD-10-CM ICD-9-CM    1. Benign essential hypertension  B90 137.1 METABOLIC PANEL, BASIC   2. Hypercholesterolemia  E78.00 272.0    3. Obesity without serious comorbidity, unspecified classification, unspecified obesity type  E66.9 278.00    4. Impaired fasting glucose  R73.01 790.21    5. Benign prostatic hyperplasia with lower urinary tract symptoms, symptom details unspecified  N40.1 600.01 PSA SCREENING (SCREENING)   6. History of elevated PSA  Z87.898 V13.89 PSA SCREENING (SCREENING)   7. Encounter for screening for malignant neoplasm of prostate   Z12.5 V76.44 PSA SCREENING (SCREENING)   8. Weight gain  R63.5 783.1 TSH 3RD GENERATION   9. Glaucoma screening  Z13.5 V80.1 REFERRAL TO OPHTHALMOLOGY        Orders Placed This Encounter    PSA SCREENING (SCREENING)    METABOLIC PANEL, BASIC    TSH 3RD GENERATION    REFERRAL TO OPHTHALMOLOGY    losartan (COZAAR) 50 mg tablet    atorvastatin (LIPITOR) 10 mg tablet       bp not controlled  Increase med  Encouraged lower sodium  Encouraged weight loss with portion control, water, veggies, keep walking  He is trying hard  Needs new eye doc; refer  Discussed the diagnosis and plan and he expressed understanding. F/u June AWV   Follow-up and Dispositions    · Return in about 7 months (around 6/24/2021) for Annual Wellness Visit, Follow up.          Zay Roberts MD

## 2020-11-23 NOTE — PATIENT INSTRUCTIONS
Your blood pressure should be less than 140/90 consistently (more than 90% of the time). Check it after sitting and resting for 10 minutes for an accurate result. Please call me if it is often running high. Increase losartan to 50mg DASH Diet: Care Instructions Your Care Instructions The DASH diet is an eating plan that can help lower your blood pressure. DASH stands for Dietary Approaches to Stop Hypertension. Hypertension is high blood pressure. The DASH diet focuses on eating foods that are high in calcium, potassium, and magnesium. These nutrients can lower blood pressure. The foods that are highest in these nutrients are fruits, vegetables, low-fat dairy products, nuts, seeds, and legumes. But taking calcium, potassium, and magnesium supplements instead of eating foods that are high in those nutrients does not have the same effect. The DASH diet also includes whole grains, fish, and poultry. The DASH diet is one of several lifestyle changes your doctor may recommend to lower your high blood pressure. Your doctor may also want you to decrease the amount of sodium in your diet. Lowering sodium while following the DASH diet can lower blood pressure even further than just the DASH diet alone. Follow-up care is a key part of your treatment and safety. Be sure to make and go to all appointments, and call your doctor if you are having problems. It's also a good idea to know your test results and keep a list of the medicines you take. How can you care for yourself at home? Following the DASH diet · Eat 4 to 5 servings of fruit each day. A serving is 1 medium-sized piece of fruit, ½ cup chopped or canned fruit, 1/4 cup dried fruit, or 4 ounces (½ cup) of fruit juice. Choose fruit more often than fruit juice. · Eat 4 to 5 servings of vegetables each day.  A serving is 1 cup of lettuce or raw leafy vegetables, ½ cup of chopped or cooked vegetables, or 4 ounces (½ cup) of vegetable juice. Choose vegetables more often than vegetable juice. · Get 2 to 3 servings of low-fat and fat-free dairy each day. A serving is 8 ounces of milk, 1 cup of yogurt, or 1 ½ ounces of cheese. · Eat 6 to 8 servings of grains each day. A serving is 1 slice of bread, 1 ounce of dry cereal, or ½ cup of cooked rice, pasta, or cooked cereal. Try to choose whole-grain products as much as possible. · Limit lean meat, poultry, and fish to 2 servings each day. A serving is 3 ounces, about the size of a deck of cards. · Eat 4 to 5 servings of nuts, seeds, and legumes (cooked dried beans, lentils, and split peas) each week. A serving is 1/3 cup of nuts, 2 tablespoons of seeds, or ½ cup of cooked beans or peas. · Limit fats and oils to 2 to 3 servings each day. A serving is 1 teaspoon of vegetable oil or 2 tablespoons of salad dressing. · Limit sweets and added sugars to 5 servings or less a week. A serving is 1 tablespoon jelly or jam, ½ cup sorbet, or 1 cup of lemonade. · Eat less than 2,300 milligrams (mg) of sodium a day. If you limit your sodium to 1,500 mg a day, you can lower your blood pressure even more. Tips for success · Start small. Do not try to make dramatic changes to your diet all at once. You might feel that you are missing out on your favorite foods and then be more likely to not follow the plan. Make small changes, and stick with them. Once those changes become habit, add a few more changes. · Try some of the following: ? Make it a goal to eat a fruit or vegetable at every meal and at snacks. This will make it easy to get the recommended amount of fruits and vegetables each day. ? Try yogurt topped with fruit and nuts for a snack or healthy dessert. ? Add lettuce, tomato, cucumber, and onion to sandwiches. ? Combine a ready-made pizza crust with low-fat mozzarella cheese and lots of vegetable toppings.  Try using tomatoes, squash, spinach, broccoli, carrots, cauliflower, and onions. ? Have a variety of cut-up vegetables with a low-fat dip as an appetizer instead of chips and dip. ? Sprinkle sunflower seeds or chopped almonds over salads. Or try adding chopped walnuts or almonds to cooked vegetables. ? Try some vegetarian meals using beans and peas. Add garbanzo or kidney beans to salads. Make burritos and tacos with mashed rodriguez beans or black beans. Where can you learn more? Go to http://www.gray.com/ Enter H495 in the search box to learn more about \"DASH Diet: Care Instructions. \" Current as of: December 16, 2019               Content Version: 12.6 © 7292-8195 [employer]. Care instructions adapted under license by [employer] (which disclaims liability or warranty for this information). If you have questions about a medical condition or this instruction, always ask your healthcare professional. [address] any warranty or liability for your use of this information.

## 2020-11-24 ENCOUNTER — OFFICE VISIT (OUTPATIENT)
Dept: FAMILY MEDICINE CLINIC | Age: 70
End: 2020-11-24
Payer: MEDICARE

## 2020-11-24 VITALS
TEMPERATURE: 97.8 F | HEART RATE: 63 BPM | BODY MASS INDEX: 33.92 KG/M2 | DIASTOLIC BLOOD PRESSURE: 84 MMHG | OXYGEN SATURATION: 97 % | WEIGHT: 203.6 LBS | HEIGHT: 65 IN | RESPIRATION RATE: 16 BRPM | SYSTOLIC BLOOD PRESSURE: 160 MMHG

## 2020-11-24 DIAGNOSIS — Z13.5 GLAUCOMA SCREENING: ICD-10-CM

## 2020-11-24 DIAGNOSIS — Z87.898 HISTORY OF ELEVATED PSA: ICD-10-CM

## 2020-11-24 DIAGNOSIS — Z12.5 ENCOUNTER FOR SCREENING FOR MALIGNANT NEOPLASM OF PROSTATE: ICD-10-CM

## 2020-11-24 DIAGNOSIS — E66.9 OBESITY WITHOUT SERIOUS COMORBIDITY, UNSPECIFIED CLASSIFICATION, UNSPECIFIED OBESITY TYPE: ICD-10-CM

## 2020-11-24 DIAGNOSIS — E78.00 HYPERCHOLESTEROLEMIA: ICD-10-CM

## 2020-11-24 DIAGNOSIS — R63.5 WEIGHT GAIN: ICD-10-CM

## 2020-11-24 DIAGNOSIS — N40.1 BENIGN PROSTATIC HYPERPLASIA WITH LOWER URINARY TRACT SYMPTOMS, SYMPTOM DETAILS UNSPECIFIED: ICD-10-CM

## 2020-11-24 DIAGNOSIS — I10 BENIGN ESSENTIAL HYPERTENSION: Primary | ICD-10-CM

## 2020-11-24 DIAGNOSIS — R73.01 IMPAIRED FASTING GLUCOSE: ICD-10-CM

## 2020-11-24 LAB
ANION GAP SERPL CALC-SCNC: 7 MMOL/L (ref 5–15)
BUN SERPL-MCNC: 17 MG/DL (ref 6–20)
BUN/CREAT SERPL: 15 (ref 12–20)
CALCIUM SERPL-MCNC: 9 MG/DL (ref 8.5–10.1)
CHLORIDE SERPL-SCNC: 107 MMOL/L (ref 97–108)
CO2 SERPL-SCNC: 25 MMOL/L (ref 21–32)
CREAT SERPL-MCNC: 1.14 MG/DL (ref 0.7–1.3)
GLUCOSE SERPL-MCNC: 111 MG/DL (ref 65–100)
POTASSIUM SERPL-SCNC: 4.5 MMOL/L (ref 3.5–5.1)
PSA SERPL-MCNC: 4.2 NG/ML (ref 0.01–4)
SODIUM SERPL-SCNC: 139 MMOL/L (ref 136–145)
TSH SERPL DL<=0.05 MIU/L-ACNC: 1.33 UIU/ML (ref 0.36–3.74)

## 2020-11-24 PROCEDURE — 99214 OFFICE O/P EST MOD 30 MIN: CPT | Performed by: FAMILY MEDICINE

## 2020-11-24 PROCEDURE — G8417 CALC BMI ABV UP PARAM F/U: HCPCS | Performed by: FAMILY MEDICINE

## 2020-11-24 PROCEDURE — 3017F COLORECTAL CA SCREEN DOC REV: CPT | Performed by: FAMILY MEDICINE

## 2020-11-24 PROCEDURE — G8427 DOCREV CUR MEDS BY ELIG CLIN: HCPCS | Performed by: FAMILY MEDICINE

## 2020-11-24 PROCEDURE — G0463 HOSPITAL OUTPT CLINIC VISIT: HCPCS | Performed by: FAMILY MEDICINE

## 2020-11-24 PROCEDURE — G8432 DEP SCR NOT DOC, RNG: HCPCS | Performed by: FAMILY MEDICINE

## 2020-11-24 PROCEDURE — G8536 NO DOC ELDER MAL SCRN: HCPCS | Performed by: FAMILY MEDICINE

## 2020-11-24 PROCEDURE — 1101F PT FALLS ASSESS-DOCD LE1/YR: CPT | Performed by: FAMILY MEDICINE

## 2020-11-24 PROCEDURE — G8754 DIAS BP LESS 90: HCPCS | Performed by: FAMILY MEDICINE

## 2020-11-24 PROCEDURE — G8753 SYS BP > OR = 140: HCPCS | Performed by: FAMILY MEDICINE

## 2020-11-24 RX ORDER — LOSARTAN POTASSIUM 50 MG/1
50 TABLET ORAL DAILY
Qty: 90 TAB | Refills: 1 | Status: SHIPPED | OUTPATIENT
Start: 2020-11-24 | End: 2020-12-18 | Stop reason: SDUPTHER

## 2020-11-24 RX ORDER — ATORVASTATIN CALCIUM 10 MG/1
10 TABLET, FILM COATED ORAL DAILY
Qty: 90 TAB | Refills: 3 | Status: SHIPPED | OUTPATIENT
Start: 2020-11-24 | End: 2021-11-09 | Stop reason: SDUPTHER

## 2020-11-24 NOTE — PROGRESS NOTES
Chief Complaint   Patient presents with    Hypertension    Cholesterol Problem     1. Have you been to the ER, urgent care clinic since your last visit? Hospitalized since your last visit? No    2. Have you seen or consulted any other health care providers outside of the [address] [place] [address] since your last visit? Include any pap smears or colon screening.  Yes When: Dr Sherman Mcleod dermatology [facility] dermatology)

## 2020-11-25 DIAGNOSIS — R97.20 ELEVATED PSA: Primary | ICD-10-CM

## 2020-12-18 RX ORDER — LOSARTAN POTASSIUM 50 MG/1
50 TABLET ORAL DAILY
Qty: 90 TAB | Refills: 1 | Status: SHIPPED | OUTPATIENT
Start: 2020-12-18 | End: 2021-08-02 | Stop reason: SDUPTHER

## 2021-01-05 ENCOUNTER — TELEPHONE (OUTPATIENT)
Dept: FAMILY MEDICINE CLINIC | Age: 71
End: 2021-01-05

## 2021-01-05 NOTE — TELEPHONE ENCOUNTER
Pt is inquiring information about the letter received, he would like to keep Dr. Jarod Aguilar as his provider and would like a call back with some information about where she is going after she leaves this practice. Pt also stated that Dr. Cathy Scales would like him to have blood work done.            [facility]# [phone]

## 2021-01-06 NOTE — TELEPHONE ENCOUNTER
Called pt to let him know that I just need to schedule a lab appt for him to recheck PSA. Appt scheduled for 1/8. As for the other question I recommended that he send Dr Patel a Sequent message.

## 2021-01-08 ENCOUNTER — APPOINTMENT (OUTPATIENT)
Dept: FAMILY MEDICINE CLINIC | Age: 71
End: 2021-01-08

## 2021-01-08 DIAGNOSIS — R97.20 ELEVATED PSA: ICD-10-CM

## 2021-01-09 LAB
PSA SERPL-MCNC: 3.4 NG/ML (ref 0–4)
REFLEX CRITERIA: NORMAL

## 2021-06-02 ENCOUNTER — OFFICE VISIT (OUTPATIENT)
Dept: FAMILY MEDICINE CLINIC | Age: 71
End: 2021-06-02
Payer: MEDICARE

## 2021-06-02 VITALS
WEIGHT: 203 LBS | TEMPERATURE: 97.6 F | SYSTOLIC BLOOD PRESSURE: 130 MMHG | OXYGEN SATURATION: 96 % | DIASTOLIC BLOOD PRESSURE: 80 MMHG | HEART RATE: 68 BPM | RESPIRATION RATE: 18 BRPM | BODY MASS INDEX: 33.82 KG/M2 | HEIGHT: 65 IN

## 2021-06-02 DIAGNOSIS — N40.1 BENIGN PROSTATIC HYPERPLASIA WITH URINARY FREQUENCY: Primary | ICD-10-CM

## 2021-06-02 DIAGNOSIS — R73.03 PREDIABETES: ICD-10-CM

## 2021-06-02 DIAGNOSIS — E78.00 HYPERCHOLESTEROLEMIA: ICD-10-CM

## 2021-06-02 DIAGNOSIS — R06.83 SNORING: ICD-10-CM

## 2021-06-02 DIAGNOSIS — I10 BENIGN ESSENTIAL HYPERTENSION: ICD-10-CM

## 2021-06-02 DIAGNOSIS — R35.0 BENIGN PROSTATIC HYPERPLASIA WITH URINARY FREQUENCY: Primary | ICD-10-CM

## 2021-06-02 DIAGNOSIS — K21.9 GASTROESOPHAGEAL REFLUX DISEASE WITHOUT ESOPHAGITIS: ICD-10-CM

## 2021-06-02 PROCEDURE — 1101F PT FALLS ASSESS-DOCD LE1/YR: CPT | Performed by: FAMILY MEDICINE

## 2021-06-02 PROCEDURE — 3017F COLORECTAL CA SCREEN DOC REV: CPT | Performed by: FAMILY MEDICINE

## 2021-06-02 PROCEDURE — G8754 DIAS BP LESS 90: HCPCS | Performed by: FAMILY MEDICINE

## 2021-06-02 PROCEDURE — G8536 NO DOC ELDER MAL SCRN: HCPCS | Performed by: FAMILY MEDICINE

## 2021-06-02 PROCEDURE — G0463 HOSPITAL OUTPT CLINIC VISIT: HCPCS | Performed by: FAMILY MEDICINE

## 2021-06-02 PROCEDURE — 99213 OFFICE O/P EST LOW 20 MIN: CPT | Performed by: FAMILY MEDICINE

## 2021-06-02 PROCEDURE — G8427 DOCREV CUR MEDS BY ELIG CLIN: HCPCS | Performed by: FAMILY MEDICINE

## 2021-06-02 PROCEDURE — G8752 SYS BP LESS 140: HCPCS | Performed by: FAMILY MEDICINE

## 2021-06-02 PROCEDURE — G8417 CALC BMI ABV UP PARAM F/U: HCPCS | Performed by: FAMILY MEDICINE

## 2021-06-02 PROCEDURE — G8510 SCR DEP NEG, NO PLAN REQD: HCPCS | Performed by: FAMILY MEDICINE

## 2021-06-02 NOTE — PROGRESS NOTES
Cedrick Rossi  69 y.o. male  1950  [address] [id number]  [id number]     [address]       Encounter Date: 6/2/2021           Established Patient Visit Note: Evy Perrin MD    Reason for Appointment:  Chief Complaint   Patient presents with   [facility]     new to provider    Cholesterol Problem    Hypertension       History of Present Illness:  History provided by patient    Cedrick Rossi is a 69 y.o. male who presents to clinic today for:    · GERD: intermittent symptoms, takes Pepcid PRN  · Frequent Urination: last saw urology about 5-6 years ago; last PSA 3.4 in Jan, 2021. He reports that this is often associated with drinking a lot of fluids. He endorses occasional urinary hesitancy, incomplete emptying. He reports using the bathrrom only once per night. He has tried flomax in the past with no improvement. · HTN: not checking BP at home, adhering to low salt diet  · Prediabetes: trying to eat helalthy, goes for walks regularly  · Provided referral to ophthalmology in Nov, 2020 for repeat eye exam, but he has not yet scheduled this. Review of Systems  Review of Systems   Constitutional: Negative for chills and fever. Respiratory: Negative for cough, shortness of breath and wheezing. Cardiovascular: Negative for chest pain and palpitations. Allergies: Patient has no known allergies. Medications: (Updated to reflect final medication list after visit)    Current Outpatient Medications:     famotidine (PEPCID PO), Take  by mouth daily as needed. , Disp: , Rfl:     cetirizine HCl (ZYRTEC PO), Take  by mouth., Disp: , Rfl:     losartan (COZAAR) 50 mg tablet, Take 1 Tab by mouth daily. increased dose, Disp: 90 Tab, Rfl: 1    atorvastatin (LIPITOR) 10 mg tablet, Take 1 Tab by mouth daily. , Disp: 90 Tab, Rfl: 3    psyllium seed-sucrose (METAMUCIL) powd, Take  by mouth.  Use twice daily, Disp: , Rfl:     History  Patient Care Team:  Sandy Bueno MD as PCP - General (Family Medicine)  Sandy Bueno MD as PCP - [facility] Empaneled Provider  Laurie Kehr, MD (Dermatology)  Krystyna Garcia MD (Ophthalmology)    Past Medical History: he has a past medical history of Benign essential hypertension, History of elevated PSA, Hypercholesterolemia, Incomplete RBBB (1/10/14), Obesity, and Prediabetes (5/13/14). Past Surgical History: he has a past surgical history that includes hx colonoscopy (11/17/16) and colonoscopy (N/A, 11/16/2016). Family Medical History: family history includes Cancer in his brother and father; Diabetes in his mother; No Known Problems in his son; Stroke in his mother. Social History: he reports that he has never smoked. He has never used smokeless tobacco. He reports current alcohol use. He reports that he does not use drugs. Health Maintenance Due   Topic Date Due    COVID-19 Vaccine (2 - Moderna 2-dose series) 04/25/2021    Medicare Yearly Exam  06/24/2021       Objective:   Visit Vitals  /80 (BP 1 Location: Right arm, BP Patient Position: Sitting, BP Cuff Size: Adult)   Pulse 68   Temp 97.6 °F (36.4 °C) (Temporal)   Resp 18   Ht 5' 4.5\" (1.638 m)   Wt 203 lb (92.1 kg)   SpO2 96%   BMI 34.31 kg/m²     Wt Readings from Last 3 Encounters:   06/02/21 203 lb (92.1 kg)   11/24/20 203 lb 9.6 oz (92.4 kg)   09/13/19 198 lb (89.8 kg)       Physical Exam  Constitutional:       Appearance: Normal appearance. HENT:      Head: Normocephalic and atraumatic. Right Ear: External ear normal.      Left Ear: External ear normal.      Nose: Nose normal.      Mouth/Throat:      Pharynx: No oropharyngeal exudate. Eyes:      General: No scleral icterus. Right eye: No discharge. Left eye: No discharge. Conjunctiva/sclera: Conjunctivae normal.      Pupils: Pupils are equal, round, and reactive to light. Neck:      Thyroid: No thyromegaly. Vascular: No JVD.       Trachea: No tracheal deviation. Cardiovascular:      Rate and Rhythm: Normal rate and regular rhythm. Heart sounds: Normal heart sounds. No murmur heard. No friction rub. No gallop. Pulmonary:      Effort: Pulmonary effort is normal. No respiratory distress. Breath sounds: Normal breath sounds. No stridor. No wheezing. Musculoskeletal:         General: No tenderness or deformity. Normal range of motion. Cervical back: Normal range of motion and neck supple. Lymphadenopathy:      Cervical: No cervical adenopathy. Skin:     General: Skin is warm and dry. Neurological:      Mental Status: He is alert. Cranial Nerves: No cranial nerve deficit. Coordination: Coordination normal.      Gait: Gait is intact. Deep Tendon Reflexes: Reflexes normal.         Assessment & Plan:      ICD-10-CM ICD-9-CM    1. Benign prostatic hyperplasia with urinary frequency  N40.1 600.01 REFERRAL TO UROLOGY    R35.0 788.41 URINALYSIS W/ RFLX MICROSCOPIC      PSA W/ REFLX FREE PSA      PSA W/ REFLX FREE PSA      URINALYSIS W/ RFLX MICROSCOPIC   2. Gastroesophageal reflux disease without esophagitis  K21.9 530.81    3. Benign essential hypertension  I10 401.1 CBC W/O DIFF      CBC W/O DIFF   4. Prediabetes  R73.03 790.29 HEMOGLOBIN A1C WITH EAG      HEMOGLOBIN A1C WITH EAG   5. Hypercholesterolemia  E78.00 272.0 LIPID PANEL      METABOLIC PANEL, COMPREHENSIVE      METABOLIC PANEL, COMPREHENSIVE      LIPID PANEL   6. Snoring  R06.83 786.09        · BPH with Urinary Frequency: Chronic, uncontrolled. Referral to urology  · GERD: Chronic, stable. Continue to monitor  · HTN: not checking BP at home, adhering to low salt diet  · Prediabetes: Chronic, stable. Check A1c  · Snoring: discussed referral to sleep medicine; patient declines  · HLD: Chronic, unclear control.  Check labs  · Reprinted referral to ophthalmology  · RTC 6 months [facility]    I have discussed the diagnosis with the patient and the intended plan as seen in the above orders. The patient has received an after-visit summary along with patient information handout. I have discussed medication side effects and warnings with the patient as well. Disposition  Follow-up and Dispositions    · Return in about 6 months (around 12/2/2021).            Yara Levy MD

## 2021-06-03 LAB
ALBUMIN SERPL-MCNC: 4.4 G/DL (ref 3.5–5)
ALBUMIN/GLOB SERPL: 1.5 {RATIO} (ref 1.1–2.2)
ALP SERPL-CCNC: 78 U/L (ref 45–117)
ALT SERPL-CCNC: 45 U/L (ref 12–78)
ANION GAP SERPL CALC-SCNC: 4 MMOL/L (ref 5–15)
APPEARANCE UR: CLEAR
AST SERPL-CCNC: 28 U/L (ref 15–37)
BILIRUB SERPL-MCNC: 0.6 MG/DL (ref 0.2–1)
BILIRUB UR QL: NEGATIVE
BUN SERPL-MCNC: 15 MG/DL (ref 6–20)
BUN/CREAT SERPL: 13 (ref 12–20)
CALCIUM SERPL-MCNC: 8.9 MG/DL (ref 8.5–10.1)
CHLORIDE SERPL-SCNC: 106 MMOL/L (ref 97–108)
CHOLEST SERPL-MCNC: 182 MG/DL
CO2 SERPL-SCNC: 28 MMOL/L (ref 21–32)
COLOR UR: NORMAL
CREAT SERPL-MCNC: 1.14 MG/DL (ref 0.7–1.3)
ERYTHROCYTE [DISTWIDTH] IN BLOOD BY AUTOMATED COUNT: 12.9 % (ref 11.5–14.5)
EST. AVERAGE GLUCOSE BLD GHB EST-MCNC: 123 MG/DL
GLOBULIN SER CALC-MCNC: 3 G/DL (ref 2–4)
GLUCOSE SERPL-MCNC: 102 MG/DL (ref 65–100)
GLUCOSE UR STRIP.AUTO-MCNC: NEGATIVE MG/DL
HBA1C MFR BLD: 5.9 % (ref 4–5.6)
HCT VFR BLD AUTO: 45.2 % (ref 36.6–50.3)
HDLC SERPL-MCNC: 49 MG/DL
HDLC SERPL: 3.7 {RATIO} (ref 0–5)
HGB BLD-MCNC: 14.4 G/DL (ref 12.1–17)
HGB UR QL STRIP: NEGATIVE
KETONES UR QL STRIP.AUTO: NEGATIVE MG/DL
LDLC SERPL CALC-MCNC: 100.4 MG/DL (ref 0–100)
LEUKOCYTE ESTERASE UR QL STRIP.AUTO: NEGATIVE
MCH RBC QN AUTO: 30.5 PG (ref 26–34)
MCHC RBC AUTO-ENTMCNC: 31.9 G/DL (ref 30–36.5)
MCV RBC AUTO: 95.8 FL (ref 80–99)
NITRITE UR QL STRIP.AUTO: NEGATIVE
NRBC # BLD: 0 K/UL (ref 0–0.01)
NRBC BLD-RTO: 0 PER 100 WBC
PH UR STRIP: 6 [PH] (ref 5–8)
PLATELET # BLD AUTO: 266 K/UL (ref 150–400)
PMV BLD AUTO: 10.7 FL (ref 8.9–12.9)
POTASSIUM SERPL-SCNC: 4.6 MMOL/L (ref 3.5–5.1)
PROT SERPL-MCNC: 7.4 G/DL (ref 6.4–8.2)
PROT UR STRIP-MCNC: NEGATIVE MG/DL
RBC # BLD AUTO: 4.72 M/UL (ref 4.1–5.7)
SODIUM SERPL-SCNC: 138 MMOL/L (ref 136–145)
SP GR UR REFRACTOMETRY: 1.02 (ref 1–1.03)
TRIGL SERPL-MCNC: 163 MG/DL (ref ?–150)
UROBILINOGEN UR QL STRIP.AUTO: 0.2 EU/DL (ref 0.2–1)
VLDLC SERPL CALC-MCNC: 32.6 MG/DL
WBC # BLD AUTO: 6.4 K/UL (ref 4.1–11.1)

## 2021-06-04 LAB
PSA SERPL-MCNC: 3.7 NG/ML (ref 0–4)
REFLEX CRITERIA: NORMAL

## 2021-06-08 PROBLEM — K21.9 GASTROESOPHAGEAL REFLUX DISEASE WITHOUT ESOPHAGITIS: Status: ACTIVE | Noted: 2021-06-08

## 2021-08-02 RX ORDER — LOSARTAN POTASSIUM 50 MG/1
50 TABLET ORAL DAILY
Qty: 90 TABLET | Refills: 1 | Status: SHIPPED | OUTPATIENT
Start: 2021-08-02 | End: 2022-02-01 | Stop reason: SDUPTHER

## 2021-08-02 NOTE — TELEPHONE ENCOUNTER
Last Visit: 6/2/21 MD Jessica Mcelroy  Next Appointment: Not scheduled- due 12/2021  Previous Refill Encounter(s): 12/18/20 90 + 1    Requested Prescriptions     Pending Prescriptions Disp Refills    losartan (COZAAR) 50 mg tablet 90 Tablet 1     Sig: Take 1 Tablet by mouth daily.  increased dose

## 2021-11-09 RX ORDER — ATORVASTATIN CALCIUM 10 MG/1
10 TABLET, FILM COATED ORAL DAILY
Qty: 90 TABLET | Refills: 0 | Status: SHIPPED | OUTPATIENT
Start: 2021-11-09 | End: 2022-02-04 | Stop reason: SDUPTHER

## 2021-11-09 NOTE — TELEPHONE ENCOUNTER
Last Visit: 06/02/2021 MD Celeste Encarnacion  Next Appointment: 6 months (12/2021)   Previous refill encounter(s)   11/24/2020 Lipitor #90 with 3 refills     Requested Prescriptions     Pending Prescriptions Disp Refills    atorvastatin (LIPITOR) 10 mg tablet 90 Tablet 0     Sig: Take 1 Tablet by mouth daily.

## 2021-12-14 ENCOUNTER — OFFICE VISIT (OUTPATIENT)
Dept: FAMILY MEDICINE CLINIC | Age: 71
End: 2021-12-14
Payer: MEDICARE

## 2021-12-14 VITALS
BODY MASS INDEX: 32.65 KG/M2 | HEIGHT: 65 IN | OXYGEN SATURATION: 96 % | RESPIRATION RATE: 16 BRPM | SYSTOLIC BLOOD PRESSURE: 135 MMHG | HEART RATE: 69 BPM | TEMPERATURE: 98.2 F | WEIGHT: 196 LBS | DIASTOLIC BLOOD PRESSURE: 81 MMHG

## 2021-12-14 DIAGNOSIS — E78.00 HYPERCHOLESTEROLEMIA: ICD-10-CM

## 2021-12-14 DIAGNOSIS — K21.9 GASTROESOPHAGEAL REFLUX DISEASE WITHOUT ESOPHAGITIS: ICD-10-CM

## 2021-12-14 DIAGNOSIS — Z00.00 MEDICARE ANNUAL WELLNESS VISIT, SUBSEQUENT: Primary | ICD-10-CM

## 2021-12-14 DIAGNOSIS — N40.1 BENIGN PROSTATIC HYPERPLASIA WITH URINARY FREQUENCY: ICD-10-CM

## 2021-12-14 DIAGNOSIS — Z13.39 SCREENING FOR ALCOHOLISM: ICD-10-CM

## 2021-12-14 DIAGNOSIS — I10 BENIGN ESSENTIAL HYPERTENSION: ICD-10-CM

## 2021-12-14 DIAGNOSIS — Z87.898 HISTORY OF ELEVATED PSA: ICD-10-CM

## 2021-12-14 DIAGNOSIS — R73.03 PREDIABETES: ICD-10-CM

## 2021-12-14 DIAGNOSIS — R35.0 BENIGN PROSTATIC HYPERPLASIA WITH URINARY FREQUENCY: ICD-10-CM

## 2021-12-14 LAB
ALBUMIN SERPL-MCNC: 4.6 G/DL (ref 3.5–5)
ALBUMIN/GLOB SERPL: 1.5 {RATIO} (ref 1.1–2.2)
ALP SERPL-CCNC: 74 U/L (ref 45–117)
ALT SERPL-CCNC: 45 U/L (ref 12–78)
ANION GAP SERPL CALC-SCNC: 10 MMOL/L (ref 5–15)
APPEARANCE UR: CLEAR
AST SERPL-CCNC: 25 U/L (ref 15–37)
BILIRUB SERPL-MCNC: 0.7 MG/DL (ref 0.2–1)
BILIRUB UR QL: NEGATIVE
BUN SERPL-MCNC: 16 MG/DL (ref 6–20)
BUN/CREAT SERPL: 13 (ref 12–20)
CALCIUM SERPL-MCNC: 9.3 MG/DL (ref 8.5–10.1)
CHLORIDE SERPL-SCNC: 105 MMOL/L (ref 97–108)
CHOLEST SERPL-MCNC: 159 MG/DL
CO2 SERPL-SCNC: 24 MMOL/L (ref 21–32)
COLOR UR: NORMAL
CREAT SERPL-MCNC: 1.19 MG/DL (ref 0.7–1.3)
ERYTHROCYTE [DISTWIDTH] IN BLOOD BY AUTOMATED COUNT: 12.5 % (ref 11.5–14.5)
EST. AVERAGE GLUCOSE BLD GHB EST-MCNC: 128 MG/DL
GLOBULIN SER CALC-MCNC: 3.1 G/DL (ref 2–4)
GLUCOSE SERPL-MCNC: 99 MG/DL (ref 65–100)
GLUCOSE UR STRIP.AUTO-MCNC: NEGATIVE MG/DL
HBA1C MFR BLD: 6.1 % (ref 4–5.6)
HCT VFR BLD AUTO: 45.9 % (ref 36.6–50.3)
HDLC SERPL-MCNC: 47 MG/DL
HDLC SERPL: 3.4 {RATIO} (ref 0–5)
HGB BLD-MCNC: 15.1 G/DL (ref 12.1–17)
HGB UR QL STRIP: NEGATIVE
KETONES UR QL STRIP.AUTO: NEGATIVE MG/DL
LDLC SERPL CALC-MCNC: 85.8 MG/DL (ref 0–100)
LEUKOCYTE ESTERASE UR QL STRIP.AUTO: NEGATIVE
MCH RBC QN AUTO: 30.8 PG (ref 26–34)
MCHC RBC AUTO-ENTMCNC: 32.9 G/DL (ref 30–36.5)
MCV RBC AUTO: 93.5 FL (ref 80–99)
NITRITE UR QL STRIP.AUTO: NEGATIVE
NRBC # BLD: 0 K/UL (ref 0–0.01)
NRBC BLD-RTO: 0 PER 100 WBC
PH UR STRIP: 6 [PH] (ref 5–8)
PLATELET # BLD AUTO: 276 K/UL (ref 150–400)
PMV BLD AUTO: 10.8 FL (ref 8.9–12.9)
POTASSIUM SERPL-SCNC: 4.4 MMOL/L (ref 3.5–5.1)
PROT SERPL-MCNC: 7.7 G/DL (ref 6.4–8.2)
PROT UR STRIP-MCNC: NEGATIVE MG/DL
RBC # BLD AUTO: 4.91 M/UL (ref 4.1–5.7)
SODIUM SERPL-SCNC: 139 MMOL/L (ref 136–145)
SP GR UR REFRACTOMETRY: 1.01 (ref 1–1.03)
TRIGL SERPL-MCNC: 131 MG/DL (ref ?–150)
TSH SERPL DL<=0.05 MIU/L-ACNC: 1.1 UIU/ML (ref 0.36–3.74)
UROBILINOGEN UR QL STRIP.AUTO: 0.2 EU/DL (ref 0.2–1)
VLDLC SERPL CALC-MCNC: 26.2 MG/DL
WBC # BLD AUTO: 6.7 K/UL (ref 4.1–11.1)

## 2021-12-14 PROCEDURE — G8417 CALC BMI ABV UP PARAM F/U: HCPCS | Performed by: FAMILY MEDICINE

## 2021-12-14 PROCEDURE — 1101F PT FALLS ASSESS-DOCD LE1/YR: CPT | Performed by: FAMILY MEDICINE

## 2021-12-14 PROCEDURE — G8510 SCR DEP NEG, NO PLAN REQD: HCPCS | Performed by: FAMILY MEDICINE

## 2021-12-14 PROCEDURE — G8752 SYS BP LESS 140: HCPCS | Performed by: FAMILY MEDICINE

## 2021-12-14 PROCEDURE — G8536 NO DOC ELDER MAL SCRN: HCPCS | Performed by: FAMILY MEDICINE

## 2021-12-14 PROCEDURE — G8754 DIAS BP LESS 90: HCPCS | Performed by: FAMILY MEDICINE

## 2021-12-14 PROCEDURE — G0442 ANNUAL ALCOHOL SCREEN 15 MIN: HCPCS | Performed by: FAMILY MEDICINE

## 2021-12-14 PROCEDURE — G8427 DOCREV CUR MEDS BY ELIG CLIN: HCPCS | Performed by: FAMILY MEDICINE

## 2021-12-14 PROCEDURE — 3017F COLORECTAL CA SCREEN DOC REV: CPT | Performed by: FAMILY MEDICINE

## 2021-12-14 PROCEDURE — G0439 PPPS, SUBSEQ VISIT: HCPCS | Performed by: FAMILY MEDICINE

## 2021-12-14 NOTE — PROGRESS NOTES
No chief complaint on file. 1. \"Have you been to the ER, urgent care clinic since your last visit? Hospitalized since your last visit? \" No    2. \"Have you seen or consulted any other health care providers outside of the [address] [place] [address] since your last visit? \" No     3. For patients aged 39-70: Has the patient had a colonoscopy? No     If the patient is female:    4. For patients aged 41-77: Has the patient had a mammogram within the past 2 years? No    5. For patients aged 21-30: Has the patient had a pap smear?  No    3 most recent PHQ Screens 6/2/2021   Little interest or pleasure in doing things Not at all   Feeling down, depressed, irritable, or hopeless Not at all   Total Score PHQ 2 0

## 2021-12-14 NOTE — PATIENT INSTRUCTIONS
Medicare Wellness Visit, Male    The best way to live healthy is to have a lifestyle where you eat a well-balanced diet, exercise regularly, limit alcohol use, and quit all forms of tobacco/nicotine, if applicable. Regular preventive services are another way to keep healthy. Preventive services (vaccines, screening tests, monitoring & exams) can help personalize your care plan, which helps you manage your own care. Screening tests can find health problems at the earliest stages, when they are easiest to treat. [facility] follows the current, evidence-based guidelines published by the [facility] ([facility]) when recommending preventive services for our patients. Because we follow these guidelines, sometimes recommendations change over time as research supports it. (For example, a prostate screening blood test is no longer routinely recommended for men with no symptoms). Of course, you and your doctor may decide to screen more often for some diseases, based on your risk and co-morbidities (chronic disease you are already diagnosed with). Preventive services for you include:  - Medicare offers their members a free annual wellness visit, which is time for you and your primary care provider to discuss and plan for your preventive service needs. Take advantage of this benefit every year!  -All adults over age 72 should receive the recommended pneumonia vaccines. Current USPSTF guidelines recommend a series of two vaccines for the best pneumonia protection.   -All adults should have a flu vaccine yearly and tetanus vaccine every 10 years.  -All adults age 48 and older should receive the shingles vaccines (series of two vaccines).        -All adults age 38-68 who are overweight should have a diabetes screening test once every three years.   -Other screening tests & preventive services for persons with diabetes include: an eye exam to screen for diabetic retinopathy, a kidney function test, a foot exam, and stricter control over your cholesterol.   -Cardiovascular screening for adults with routine risk involves an electrocardiogram (ECG) at intervals determined by the provider.   -Colorectal cancer screening should be done for adults age 54-65 with no increased risk factors for colorectal cancer. There are a number of acceptable methods of screening for this type of cancer. Each test has its own benefits and drawbacks. Discuss with your provider what is most appropriate for you during your annual wellness visit. The different tests include: colonoscopy (considered the best screening method), a fecal occult blood test, a fecal DNA test, and sigmoidoscopy.  -All adults born between [facility] should be screened once for Hepatitis C.  -An Abdominal Aortic Aneurysm (AAA) Screening is recommended for men age 73-68 who has ever smoked in their lifetime.      Here is a list of your current Health Maintenance items (your personalized list of preventive services) with a due date:  Health Maintenance Due   Topic Date Due    COVID-19 Vaccine (2 - Moderna 3-dose booster series) 04/25/2021    Yearly Flu Vaccine (1) 09/01/2021

## 2021-12-14 NOTE — PROGRESS NOTES
This is the Subsequent Medicare Annual Wellness Exam, performed 12 months or more after the Initial AWV or the last Subsequent AWV    · GERD: takes Pepcid PRN. He reports that he seems to come and go. He reports that he visit his son in [place], and he did not have it at all. He believes that it is associated with allergies. He is not interested in seeing allergy or GI. · Frequent Urination:he was given referral to urology at last visit on 6/2/21, but he did not schedule this d/t \"it's just the willpower to do it and symptoms have been okay\"  · HTN: not checking BP at home, adhering to low salt diet  · Prediabetes: He has been following keto diet. He reports that he walks regularly and plays golf. · Provided referral to ophthalmology in Nov, 2020 for repeat eye exam, but he has not yet scheduled this. · Immunizations: he has had 3/3 COVID vaccines and flu vaccine. I have reviewed the patient's medical history in detail and updated the computerized patient record. Assessment/Plan   Education and counseling provided:  Are appropriate based on today's review and evaluation      ICD-10-CM ICD-9-CM    1. Medicare annual wellness visit, subsequent  Z00.00 V70.0    2. Screening for alcoholism  Z13.39 V79.1 TX ANNUAL ALCOHOL SCREEN 15 MIN   3. History of elevated PSA  Z87.898 V13.89 PSA W/ REFLX FREE PSA      PSA W/ REFLX FREE PSA   4. Gastroesophageal reflux disease without esophagitis  K21.9 530.81    5. Prediabetes  R73.03 790.29 HEMOGLOBIN A1C WITH EAG      HEMOGLOBIN A1C WITH EAG   6. Benign essential hypertension  I10 401.1 CBC W/O DIFF      URINALYSIS W/ RFLX MICROSCOPIC      TSH 3RD GENERATION      TSH 3RD GENERATION      URINALYSIS W/ RFLX MICROSCOPIC      CBC W/O DIFF   7. Hypercholesterolemia  E78.00 272.0 LIPID PANEL      METABOLIC PANEL, COMPREHENSIVE      METABOLIC PANEL, COMPREHENSIVE      LIPID PANEL   8.  Benign prostatic hyperplasia with urinary frequency  N40.1 600.01 PSA W/ REFLX FREE PSA    R35.0 788.41 PSA W/ REFLX FREE PSA     · Medicare Wellness Exam: Reviewed and addressed patient's medical history and concerns as discussed in note. Reviewed recommended screenings and immunizations. Discussed recommendations for diet, exercise, and lifestyle. · GERD: Chronic, stable. Continue current therapy. · Frequent Urination:Advised patient to schedule urolgy as previously discussed. · HTN: Chronic, stable. Continue current therapy. · Prediabetes: Chronic, stable. Continue current therapy. Depression Risk Factor Screening     3 most recent PHQ Screens 12/14/2021   Little interest or pleasure in doing things Not at all   Feeling down, depressed, irritable, or hopeless Not at all   Total Score PHQ 2 0       Alcohol Risk Screen    Do you average more than 1 drink per night or more than 7 drinks a week: No    In the past three months have you have had more than 4 drinks containing alcohol on one occasion: No        Functional Ability and Level of Safety    Hearing: Hearing is good. Activities of Daily Living: The home contains: handrails  Patient does total self care      Ambulation: with no difficulty     Fall Risk:  Fall Risk Assessment, last 12 mths 6/2/2021   Able to walk? Yes   Fall in past 12 months? 0   Do you feel unsteady? 0   Are you worried about falling 0   Number of falls in past 12 months -   Fall with injury?  -      Abuse Screen:  Patient is not abused       Cognitive Screening    Has your family/caregiver stated any concerns about your memory: no     Cognitive Screening: normal    Health Maintenance Due     Health Maintenance Due   Topic Date Due    Shingrix Vaccine Age 49> (1 of 2) Never done    COVID-19 Vaccine (2 - [employer] 3-dose series) 04/25/2021    Flu Vaccine (1) 09/01/2021       Patient Care Team   Patient Care Team:  Reji Cannon MD as PCP - General (Family Medicine)  Reji Cannon MD as PCP - REHABILITATION [facility]  Ryland Wong MD (Dermatology)  Kevin Mccloud MD (Ophthalmology)    History     Patient Active Problem List   Diagnosis Code    Incomplete RBBB I45.10    Hypercholesterolemia E78.00    Benign essential hypertension I10    Prediabetes R73.03    Obesity E66.9    History of elevated PSA Z87.898    Benign prostatic hyperplasia with urinary frequency N40.1, R35.0    Gastroesophageal reflux disease without esophagitis K21.9     Past Medical History:   Diagnosis Date    Benign essential hypertension     History of elevated PSA     2.4 (11/2012), 3.6 (1/2014), 2.1 (3/2015) evaluated by Dr. Diane Piedra, repeat PSA 1.9 (2/21/14); 2.1 (3/16/15), 2.4 (3/11/16), 2.7 (9/2016) - referred back to Dr. Taylor Bergman Hypercholesterolemia     Incomplete RBBB 1/10/14    Obesity     Prediabetes 5/13/14      Past Surgical History:   Procedure Laterality Date    COLONOSCOPY N/A 11/16/2016    COLONOSCOPY performed by Umang Vela MD at [address] HX COLONOSCOPY  11/17/16    Dr. Tessy Chandler     Current Outpatient Medications   Medication Sig Dispense Refill    atorvastatin (LIPITOR) 10 mg tablet Take 1 Tablet by mouth daily. 90 Tablet 0    losartan (COZAAR) 50 mg tablet Take 1 Tablet by mouth daily. 90 Tablet 1    famotidine (PEPCID PO) Take  by mouth daily as needed.  cetirizine HCl (ZYRTEC PO) Take  by mouth.  psyllium seed-sucrose (METAMUCIL) powd Take  by mouth. Use twice daily       No Known Allergies    Family History   Problem Relation Age of Onset    Stroke Mother     Diabetes Mother     Cancer Father         colon    Cancer Brother         bone    No Known Problems Son      Social History     Tobacco Use    Smoking status: Never Smoker    Smokeless tobacco: Never Used   Substance Use Topics    Alcohol use:  Yes     Alcohol/week: 0.0 standard drinks     Comment: rarely         Jodi Billy MD

## 2021-12-16 LAB
PSA SERPL-MCNC: 3.8 NG/ML (ref 0–4)
REFLEX CRITERIA: NORMAL

## 2021-12-17 NOTE — PROGRESS NOTES
Dear Cipriano Irene,    All of your labs are stable from last check. Keep up the good work. If you have any questions, please feel free to call our office at [phone].      Jodi Billy MD

## 2022-02-01 RX ORDER — LOSARTAN POTASSIUM 50 MG/1
50 TABLET ORAL DAILY
Qty: 90 TABLET | Refills: 1 | Status: SHIPPED | OUTPATIENT
Start: 2022-02-01 | End: 2022-07-22 | Stop reason: SDUPTHER

## 2022-02-04 RX ORDER — ATORVASTATIN CALCIUM 10 MG/1
10 TABLET, FILM COATED ORAL DAILY
Qty: 90 TABLET | Refills: 3 | Status: SHIPPED | OUTPATIENT
Start: 2022-02-04

## 2022-02-04 NOTE — TELEPHONE ENCOUNTER
Last Visit: 12/14/21 MD Mariana Reece, lipid completed  Next Appointment: 6/14/22 MD Mariana Reece  Previous Refill Encounter(s): 11/9/21 90    Requested Prescriptions     Pending Prescriptions Disp Refills    atorvastatin (LIPITOR) 10 mg tablet 90 Tablet 3     Sig: Take 1 Tablet by mouth daily.

## 2022-03-11 ENCOUNTER — TELEPHONE (OUTPATIENT)
Dept: FAMILY MEDICINE CLINIC | Age: 72
End: 2022-03-11

## 2022-03-11 NOTE — TELEPHONE ENCOUNTER
----- Message from Jae Fang sent at 3/11/2022  8:52 AM EST -----  Subject: Message to Provider    QUESTIONS  Information for Provider? Pt is requesting a referral for ear wax   cleaning. Can he come to the office or does he need refreral  ---------------------------------------------------------------------------  --------------  [address]  What is the best way for the office to contact you? OK to leave message on   voicemail  Preferred Call Back Phone Number? [phone]  ---------------------------------------------------------------------------  --------------  SCRIPT ANSWERS  Relationship to Patient?  Self

## 2022-03-18 PROBLEM — N40.1 BENIGN PROSTATIC HYPERPLASIA WITH URINARY FREQUENCY: Status: ACTIVE | Noted: 2019-09-13

## 2022-03-18 PROBLEM — K21.9 GASTROESOPHAGEAL REFLUX DISEASE WITHOUT ESOPHAGITIS: Status: ACTIVE | Noted: 2021-06-08

## 2022-03-18 PROBLEM — R35.0 BENIGN PROSTATIC HYPERPLASIA WITH URINARY FREQUENCY: Status: ACTIVE | Noted: 2019-09-13

## 2022-06-14 ENCOUNTER — OFFICE VISIT (OUTPATIENT)
Dept: FAMILY MEDICINE CLINIC | Age: 72
End: 2022-06-14
Payer: MEDICARE

## 2022-06-14 VITALS
SYSTOLIC BLOOD PRESSURE: 131 MMHG | RESPIRATION RATE: 16 BRPM | HEART RATE: 67 BPM | BODY MASS INDEX: 31.92 KG/M2 | HEIGHT: 65 IN | TEMPERATURE: 97.9 F | WEIGHT: 191.6 LBS | DIASTOLIC BLOOD PRESSURE: 83 MMHG | OXYGEN SATURATION: 96 %

## 2022-06-14 DIAGNOSIS — K21.9 GASTROESOPHAGEAL REFLUX DISEASE WITHOUT ESOPHAGITIS: ICD-10-CM

## 2022-06-14 DIAGNOSIS — I10 BENIGN ESSENTIAL HYPERTENSION: ICD-10-CM

## 2022-06-14 DIAGNOSIS — N40.1 BENIGN PROSTATIC HYPERPLASIA WITH URINARY FREQUENCY: Primary | ICD-10-CM

## 2022-06-14 DIAGNOSIS — R35.0 BENIGN PROSTATIC HYPERPLASIA WITH URINARY FREQUENCY: Primary | ICD-10-CM

## 2022-06-14 DIAGNOSIS — E78.00 HYPERCHOLESTEROLEMIA: ICD-10-CM

## 2022-06-14 DIAGNOSIS — E66.9 OBESITY WITHOUT SERIOUS COMORBIDITY, UNSPECIFIED CLASSIFICATION, UNSPECIFIED OBESITY TYPE: ICD-10-CM

## 2022-06-14 DIAGNOSIS — R73.03 PREDIABETES: ICD-10-CM

## 2022-06-14 LAB
ALBUMIN SERPL-MCNC: 4.6 G/DL (ref 3.5–5)
ALBUMIN/GLOB SERPL: 1.3 {RATIO} (ref 1.1–2.2)
ALP SERPL-CCNC: 73 U/L (ref 45–117)
ALT SERPL-CCNC: 42 U/L (ref 12–78)
ANION GAP SERPL CALC-SCNC: 6 MMOL/L (ref 5–15)
AST SERPL-CCNC: 24 U/L (ref 15–37)
BILIRUB SERPL-MCNC: 0.7 MG/DL (ref 0.2–1)
BUN SERPL-MCNC: 17 MG/DL (ref 6–20)
BUN/CREAT SERPL: 13 (ref 12–20)
CALCIUM SERPL-MCNC: 9.8 MG/DL (ref 8.5–10.1)
CHLORIDE SERPL-SCNC: 104 MMOL/L (ref 97–108)
CHOLEST SERPL-MCNC: 174 MG/DL
CO2 SERPL-SCNC: 27 MMOL/L (ref 21–32)
COMMENT, HOLDF: NORMAL
CREAT SERPL-MCNC: 1.3 MG/DL (ref 0.7–1.3)
ERYTHROCYTE [DISTWIDTH] IN BLOOD BY AUTOMATED COUNT: 12.7 % (ref 11.5–14.5)
EST. AVERAGE GLUCOSE BLD GHB EST-MCNC: 123 MG/DL
GLOBULIN SER CALC-MCNC: 3.6 G/DL (ref 2–4)
GLUCOSE SERPL-MCNC: 111 MG/DL (ref 65–100)
HBA1C MFR BLD: 5.9 % (ref 4–5.6)
HCT VFR BLD AUTO: 47.2 % (ref 36.6–50.3)
HDLC SERPL-MCNC: 51 MG/DL
HDLC SERPL: 3.4 {RATIO} (ref 0–5)
HGB BLD-MCNC: 15.7 G/DL (ref 12.1–17)
LDLC SERPL CALC-MCNC: 96.8 MG/DL (ref 0–100)
MCH RBC QN AUTO: 30.7 PG (ref 26–34)
MCHC RBC AUTO-ENTMCNC: 33.3 G/DL (ref 30–36.5)
MCV RBC AUTO: 92.2 FL (ref 80–99)
NRBC # BLD: 0 K/UL (ref 0–0.01)
NRBC BLD-RTO: 0 PER 100 WBC
PLATELET # BLD AUTO: 263 K/UL (ref 150–400)
PMV BLD AUTO: 10.6 FL (ref 8.9–12.9)
POTASSIUM SERPL-SCNC: 4.8 MMOL/L (ref 3.5–5.1)
PROT SERPL-MCNC: 8.2 G/DL (ref 6.4–8.2)
RBC # BLD AUTO: 5.12 M/UL (ref 4.1–5.7)
SAMPLES BEING HELD,HOLD: NORMAL
SODIUM SERPL-SCNC: 137 MMOL/L (ref 136–145)
TRIGL SERPL-MCNC: 131 MG/DL (ref ?–150)
TSH SERPL DL<=0.05 MIU/L-ACNC: 1.23 UIU/ML (ref 0.36–3.74)
VLDLC SERPL CALC-MCNC: 26.2 MG/DL
WBC # BLD AUTO: 7.6 K/UL (ref 4.1–11.1)

## 2022-06-14 PROCEDURE — 3017F COLORECTAL CA SCREEN DOC REV: CPT | Performed by: FAMILY MEDICINE

## 2022-06-14 PROCEDURE — G8536 NO DOC ELDER MAL SCRN: HCPCS | Performed by: FAMILY MEDICINE

## 2022-06-14 PROCEDURE — G8752 SYS BP LESS 140: HCPCS | Performed by: FAMILY MEDICINE

## 2022-06-14 PROCEDURE — G8754 DIAS BP LESS 90: HCPCS | Performed by: FAMILY MEDICINE

## 2022-06-14 PROCEDURE — G8417 CALC BMI ABV UP PARAM F/U: HCPCS | Performed by: FAMILY MEDICINE

## 2022-06-14 PROCEDURE — G8510 SCR DEP NEG, NO PLAN REQD: HCPCS | Performed by: FAMILY MEDICINE

## 2022-06-14 PROCEDURE — 1123F ACP DISCUSS/DSCN MKR DOCD: CPT | Performed by: FAMILY MEDICINE

## 2022-06-14 PROCEDURE — G0463 HOSPITAL OUTPT CLINIC VISIT: HCPCS | Performed by: FAMILY MEDICINE

## 2022-06-14 PROCEDURE — G8427 DOCREV CUR MEDS BY ELIG CLIN: HCPCS | Performed by: FAMILY MEDICINE

## 2022-06-14 PROCEDURE — 1101F PT FALLS ASSESS-DOCD LE1/YR: CPT | Performed by: FAMILY MEDICINE

## 2022-06-14 PROCEDURE — 99213 OFFICE O/P EST LOW 20 MIN: CPT | Performed by: FAMILY MEDICINE

## 2022-06-14 NOTE — PROGRESS NOTES
Tiffanie Jackman  66 y.o. male  1950  [address] [id number]  [id number]     [address]       Encounter Date: 6/14/2022           Established Patient Visit Note: Magda Lira MD    Reason for Appointment:  Chief Complaint   Patient presents with    Follow Up Chronic Condition         History of Present Illness:  History provided by patient    Tiffanie Jackman is a 66 y.o. male who presents to clinic today for:       · Obesity: he reports that he has started keto diet. He states that since doing this he is no longer constipated. He has lost 5 lbs from last visit. He reports that he is also eating more fruits and vegetales and the majority of his protein comes from chicken and fish. · Allergies: he reports as well controlled with Zyrtec  · GERD: takes Pepcid PRN. He reports that it depends on the day. · Frequent Urination:he was given referral to urology on 6/2/21, but he did not schedule this. He has tried flomax in the past, but he reports that it did not help him. Last PSA on 12/14/22 was 3.8.  · HTN: not checking BP at home, continues Losartan. He reports that he does not each much salt  · HLD: continues Lipitor 10mg qhs  · Prediabetes: last A1c was 6.1 on 12/14/22    HM  · He declines referral to eye dotor  · Immunizations: he has had 4COVID vaccines       Review of Systems  All other ROS were reviewed and are negative except as discussed in HPI        Allergies: Patient has no known allergies. Medications:     Current Outpatient Medications:     atorvastatin (LIPITOR) 10 mg tablet, Take 1 Tablet by mouth daily. , Disp: 90 Tablet, Rfl: 3    losartan (COZAAR) 50 mg tablet, Take 1 Tablet by mouth daily. , Disp: 90 Tablet, Rfl: 1    famotidine (PEPCID PO), Take  by mouth daily as needed. , Disp: , Rfl:     cetirizine HCl (ZYRTEC PO), Take  by mouth., Disp: , Rfl:     psyllium seed-sucrose (METAMUCIL) powd, Take  by mouth.  Use twice daily (Patient not taking: Reported on 6/14/2022), Disp: , Rfl:     History  Patient Care Team:  Mak Lyon MD as PCP - General (Family Medicine)  Mak Lyon MD as PCP - [facility]  Jose Mims MD (Dermatology Physician)  Ibis Linda MD (Ophthalmology)    Past Medical History: he has a past medical history of Benign essential hypertension, History of elevated PSA, Hypercholesterolemia, Incomplete RBBB (1/10/14), Obesity, and Prediabetes (5/13/14). Past Surgical History: he has a past surgical history that includes hx colonoscopy (11/17/16) and colonoscopy (N/A, 11/16/2016). Family Medical History: family history includes Cancer in his brother and father; Diabetes in his mother; No Known Problems in his son; Stroke in his mother. Social History: he reports that he has never smoked. He has never used smokeless tobacco. He reports current alcohol use. He reports that he does not use drugs. Objective:   Visit Vitals  /83 (BP 1 Location: Right upper arm, BP Patient Position: Sitting, BP Cuff Size: Large adult)   Pulse 67   Temp 97.9 °F (36.6 °C) (Temporal)   Resp 16   Ht 5' 4.5\" (1.638 m)   Wt 191 lb 9.6 oz (86.9 kg)   SpO2 96%   BMI 32.38 kg/m²     Wt Readings from Last 3 Encounters:   06/14/22 191 lb 9.6 oz (86.9 kg)   12/14/21 196 lb (88.9 kg)   06/02/21 203 lb (92.1 kg)       Physical Exam  HENT:      Head: Normocephalic and atraumatic. Right Ear: External ear normal.      Left Ear: External ear normal.      Nose: Nose normal.      Mouth/Throat:      Pharynx: No oropharyngeal exudate. Eyes:      General: No scleral icterus. Right eye: No discharge. Left eye: No discharge. Conjunctiva/sclera: Conjunctivae normal.      Pupils: Pupils are equal, round, and reactive to light. Neck:      Thyroid: No thyromegaly. Vascular: No JVD. Trachea: No tracheal deviation. Cardiovascular:      Rate and Rhythm: Normal rate and regular rhythm.       Heart sounds: Normal heart sounds. No murmur heard. No friction rub. No gallop. Pulmonary:      Effort: Pulmonary effort is normal. No respiratory distress. Breath sounds: Normal breath sounds. No stridor. No wheezing. Abdominal:      General: Bowel sounds are normal. There is no distension. Palpations: Abdomen is soft. There is no mass. Tenderness: There is no abdominal tenderness. There is no guarding or rebound. Musculoskeletal:         General: No tenderness or deformity. Normal range of motion. Cervical back: Normal range of motion and neck supple. Lymphadenopathy:      Cervical: No cervical adenopathy. Skin:     General: Skin is warm and dry. Neurological:      Mental Status: He is alert. Cranial Nerves: No cranial nerve deficit. Coordination: Coordination normal.      Gait: Gait is intact. Deep Tendon Reflexes: Reflexes normal.         Assessment & Plan:      ICD-10-CM ICD-9-CM    1. Benign prostatic hyperplasia with urinary frequency  N40.1 600.01 REFERRAL TO UROLOGY    R35.0 788.41 PSA W/ REFLX FREE PSA      PSA W/ REFLX FREE PSA   2. Prediabetes  R73.03 790.29 HEMOGLOBIN A1C WITH EAG      HEMOGLOBIN A1C WITH EAG   3. Benign essential hypertension  I10 401.1 CBC W/O DIFF      METABOLIC PANEL, COMPREHENSIVE      URINALYSIS W/ RFLX MICROSCOPIC      TSH 3RD GENERATION      TSH 3RD GENERATION      METABOLIC PANEL, COMPREHENSIVE      CBC W/O DIFF   4. Hypercholesterolemia  E78.00 272.0 LIPID PANEL      LIPID PANEL   5. Obesity without serious comorbidity, unspecified classification, unspecified obesity type  E66.9 278.00    6. Gastroesophageal reflux disease without esophagitis  K21.9 530.81        · BPH: Chronic, uncontrolled. Obtain PSA and referral to urology for further evaluation.  All other conditions listed above: Chronic, stable and/or managed by specialist. Will continue current treatment regimen. Will check labs as reflected above.  Discussed following up with specialist as scheduled. Discussed recommendations for diet and exercise. I have discussed the diagnosis with the patient and the intended plan as seen in the above orders. The patient has received an after-visit summary along with patient information handout. I have discussed medication side effects and warnings with the patient as well.     Disposition  Follow-up and Dispositions    · Return in about 6 months (around 12/14/2022) for medicare wellness exam.           Norberto Morales MD

## 2022-06-14 NOTE — PROGRESS NOTES
Chief Complaint   Patient presents with    Follow Up Chronic Condition         1. \"Have you been to the ER, urgent care clinic since your last visit? Hospitalized since your last visit? \" No    2. \"Have you seen or consulted any other health care providers outside of the [address] [place] [address] since your last visit? \" No     3. For patients over 45: Has the patient had a colonoscopy?  Yes - no Care Gap present       3 most recent PHQ Screens 6/14/2022   Little interest or pleasure in doing things Not at all   Feeling down, depressed, irritable, or hopeless Not at all   Total Score PHQ 2 0       Health Maintenance Due   Topic Date Due    Shingrix Vaccine Age 50> (1 of 2) Never done    COVID-19 Vaccine (2 - Moderna 3-dose series) 04/25/2021

## 2022-06-15 LAB
% FREE PSA, 480797: 16.7 %
PSA SERPL-MCNC: 4.6 NG/ML (ref 0–4)
PSA, FREE, 480853: 0.77 NG/ML
REFLEX CRITERIA: ABNORMAL

## 2022-06-17 ENCOUNTER — TELEPHONE (OUTPATIENT)
Dept: FAMILY MEDICINE CLINIC | Age: 72
End: 2022-06-17

## 2022-06-17 DIAGNOSIS — N28.9 FUNCTION KIDNEY DECREASED: Primary | ICD-10-CM

## 2022-06-17 NOTE — PROGRESS NOTES
Called patient and discussed lab results. PSA elevated. Advised addressing this with urology at upcoming visit. Discussed decreased GFR. Patient reports not drinking much water that day. Advised rechecking in 3-4 weeks. He is in agreement.

## 2022-07-22 RX ORDER — LOSARTAN POTASSIUM 50 MG/1
50 TABLET ORAL DAILY
Qty: 90 TABLET | Refills: 1 | Status: SHIPPED | OUTPATIENT
Start: 2022-07-22

## 2022-07-22 NOTE — TELEPHONE ENCOUNTER
MD Heather Rey,    Patient call and needs new rx for the losartan to [facility].  (Has 2 refills available for the atorvastatin and [facility] filling- transferred from [facility]). Thanks, Kristel Carranza    Last Visit: 6/14/22 MD Heather Rey  Next Appointment: 12/16/22 MD Heather Rey  Previous Refill Encounter(s): 2/1/22 90 + 1    Requested Prescriptions     Pending Prescriptions Disp Refills    losartan (COZAAR) 50 mg tablet 90 Tablet 1     Sig: Take 1 Tablet by mouth in the morning. For [address] [facility] in place:   Recommendation Provided To:    Intervention Detail: New Rx: 1, reason: Patient Preference  Gap Closed?:   Intervention Accepted By:   Time Spent (min): 10

## 2022-12-19 ENCOUNTER — OFFICE VISIT (OUTPATIENT)
Dept: FAMILY MEDICINE CLINIC | Age: 72
End: 2022-12-19
Payer: MEDICARE

## 2022-12-19 ENCOUNTER — APPOINTMENT (OUTPATIENT)
Dept: FAMILY MEDICINE CLINIC | Age: 72
End: 2022-12-19

## 2022-12-19 VITALS
TEMPERATURE: 97.3 F | OXYGEN SATURATION: 97 % | SYSTOLIC BLOOD PRESSURE: 124 MMHG | WEIGHT: 193 LBS | DIASTOLIC BLOOD PRESSURE: 76 MMHG | HEART RATE: 68 BPM | BODY MASS INDEX: 32.15 KG/M2 | HEIGHT: 65 IN | RESPIRATION RATE: 14 BRPM

## 2022-12-19 DIAGNOSIS — R35.0 BENIGN PROSTATIC HYPERPLASIA WITH URINARY FREQUENCY: ICD-10-CM

## 2022-12-19 DIAGNOSIS — Z00.00 MEDICARE ANNUAL WELLNESS VISIT, SUBSEQUENT: Primary | ICD-10-CM

## 2022-12-19 DIAGNOSIS — I10 BENIGN ESSENTIAL HYPERTENSION: ICD-10-CM

## 2022-12-19 DIAGNOSIS — N40.1 BENIGN PROSTATIC HYPERPLASIA WITH URINARY FREQUENCY: ICD-10-CM

## 2022-12-19 DIAGNOSIS — R73.03 PREDIABETES: ICD-10-CM

## 2022-12-19 DIAGNOSIS — E78.00 HYPERCHOLESTEROLEMIA: ICD-10-CM

## 2022-12-19 PROCEDURE — G8427 DOCREV CUR MEDS BY ELIG CLIN: HCPCS | Performed by: FAMILY MEDICINE

## 2022-12-19 PROCEDURE — 3017F COLORECTAL CA SCREEN DOC REV: CPT | Performed by: FAMILY MEDICINE

## 2022-12-19 PROCEDURE — 1101F PT FALLS ASSESS-DOCD LE1/YR: CPT | Performed by: FAMILY MEDICINE

## 2022-12-19 PROCEDURE — G0439 PPPS, SUBSEQ VISIT: HCPCS | Performed by: FAMILY MEDICINE

## 2022-12-19 PROCEDURE — G8536 NO DOC ELDER MAL SCRN: HCPCS | Performed by: FAMILY MEDICINE

## 2022-12-19 PROCEDURE — 3074F SYST BP LT 130 MM HG: CPT | Performed by: FAMILY MEDICINE

## 2022-12-19 PROCEDURE — G8417 CALC BMI ABV UP PARAM F/U: HCPCS | Performed by: FAMILY MEDICINE

## 2022-12-19 PROCEDURE — G8510 SCR DEP NEG, NO PLAN REQD: HCPCS | Performed by: FAMILY MEDICINE

## 2022-12-19 PROCEDURE — 3078F DIAST BP <80 MM HG: CPT | Performed by: FAMILY MEDICINE

## 2022-12-19 PROCEDURE — 1123F ACP DISCUSS/DSCN MKR DOCD: CPT | Performed by: FAMILY MEDICINE

## 2022-12-19 RX ORDER — LOSARTAN POTASSIUM 50 MG/1
50 TABLET ORAL DAILY
Qty: 90 TABLET | Refills: 3 | Status: SHIPPED | OUTPATIENT
Start: 2022-12-19

## 2022-12-19 RX ORDER — ATORVASTATIN CALCIUM 10 MG/1
10 TABLET, FILM COATED ORAL DAILY
Qty: 90 TABLET | Refills: 3 | Status: SHIPPED | OUTPATIENT
Start: 2022-12-19

## 2022-12-19 NOTE — PROGRESS NOTES
Chief Complaint   Patient presents with    Annual Wellness Visit       1. \"Have you been to the ER, urgent care clinic since your last visit? Hospitalized since your last visit? \" No    2. \"Have you seen or consulted any other health care providers outside of the [address] [place] [address] since your last visit? \" No     3. For patients aged 39-70: Has the patient had a colonoscopy / FIT/ Cologuard? Yes - no Care Gap present      If the patient is female:    4. For patients aged 41-77: Has the patient had a mammogram within the past 2 years? NA - based on age or sex      11. For patients aged 21-65: Has the patient had a pap smear? NA - based on age or sex         3 most recent PHQ Screens 12/19/2022   Little interest or pleasure in doing things Not at all   Feeling down, depressed, irritable, or hopeless Not at all   Total Score PHQ 2 0   Trouble falling or staying asleep, or sleeping too much Not at all   Feeling tired or having little energy Not at all   Poor appetite, weight loss, or overeating Not at all   Feeling bad about yourself - or that you are a failure or have let yourself or your family down Not at all   Trouble concentrating on things such as school, work, reading, or watching TV Not at all   Moving or speaking so slowly that other people could have noticed; or the opposite being so fidgety that others notice Not at all   Thoughts of being better off dead, or hurting yourself in some way Not at all   PHQ 9 Score 0   How difficult have these problems made it for you to do your work, take care of your home and get along with others Not difficult at all       Abuse Screening Questionnaire 12/19/2022   Do you ever feel afraid of your partner? N   Are you in a relationship with someone who physically or mentally threatens you? N   Is it safe for you to go home?  Y       ADL Assessment 12/19/2022   Feeding yourself No Help Needed   Getting from bed to chair No Help Needed   Getting dressed No Help Needed Bathing or showering No Help Needed   Walk across the room (includes cane/walker) No Help Needed   Using the telphone No Help Needed   Taking your medications No Help Needed   Preparing meals No Help Needed   Managing money (expenses/bills) No Help Needed   Moderately strenuous housework (laundry) No Help Needed   Shopping for personal items (toiletries/medicines) No Help Needed   Shopping for groceries No Help Needed   Driving No Help Needed   Climbing a flight of stairs No Help Needed   Getting to places beyond walking distances No Help Needed       Fall Risk Assessment, last 12 mths 12/19/2022   Able to walk? Yes   Fall in past 12 months? 0   Do you feel unsteady?  0   Are you worried about falling 0   Number of falls in past 12 months -   Fall with injury? -         Health Maintenance Due   Topic Date Due    Shingrix Vaccine Age 49> (1 of 2) Never done    COVID-19 Vaccine (2 - Moderna series) 04/25/2021    Flu Vaccine (1) 08/01/2022    Medicare Yearly Exam  12/15/2022

## 2022-12-19 NOTE — PROGRESS NOTES
This is the Subsequent Medicare Annual Wellness Exam, performed 12 months or more after the Initial AWV or the last Subsequent AWV    Allergies: he reports as well controlled with Zyrtec  GERD: takes Pepcid PRN. He reports that it depends on the day. Frequent Urination, Elevated PSA: he reports that he last saw urology (Dr. Julianne Yo) one week ago with PSA of 4.7 and plan to follow up in one year. HTN: not checking BP at home, continues Losartan. He reports that he does not each much salt  HLD: continues Lipitor 10mg qhs  Prediabetes: last A1c was 5.9 on 6/14/22; he minimizes his sugar intake     Screenings  He declines referral to eye dotor    Immunizations:  COVID Vaccine: he reports last booster about 6 weeks ago  Shingles Vacine: he reports having the old vaccine 10 years ago at [facility]. He is not interested in Shingrix vaccine  Flu Vaccine: he reports having this recently at [address] [place] [address]  All other ROS were reviewed and are negative except as discussed in HPI       I have reviewed the patient's medical history in detail and updated the computerized patient record. Assessment/Plan   Education and counseling provided:  Are appropriate based on today's review and evaluation      ICD-10-CM ICD-9-CM    1. Medicare annual wellness visit, subsequent  Z00.00 V70.0       2. Prediabetes  R73.03 790.29 HEMOGLOBIN A1C WITH EAG      HEMOGLOBIN A1C WITH EAG      3. Benign essential hypertension  I10 401.1 CBC W/O DIFF      METABOLIC PANEL, COMPREHENSIVE      URINALYSIS W/ RFLX MICROSCOPIC      URINALYSIS W/ RFLX MICROSCOPIC      METABOLIC PANEL, COMPREHENSIVE      CBC W/O DIFF      4. Hypercholesterolemia  E78.00 272.0 LIPID PANEL      LIPID PANEL      5. Benign prostatic hyperplasia with urinary frequency  N40.1 600.01     R35.0 788. 41            Medicare Wellness Exam: Reviewed and addressed patient's medical history and concerns as discussed in note.  Reviewed recommended screenings and immunizations. Discussed recommendations for diet, exercise, and lifestyle. All conditions listed above: Chronic, stable and/or managed by specialist. Will continue current treatment regimen. Will check labs as reflected above. Discussed following up with specialist as scheduled. Discussed recommendations for diet and exercise. Depression Risk Factor Screening     3 most recent PHQ Screens 12/19/2022   Little interest or pleasure in doing things Not at all   Feeling down, depressed, irritable, or hopeless Not at all   Total Score PHQ 2 0   Trouble falling or staying asleep, or sleeping too much Not at all   Feeling tired or having little energy Not at all   Poor appetite, weight loss, or overeating Not at all   Feeling bad about yourself - or that you are a failure or have let yourself or your family down Not at all   Trouble concentrating on things such as school, work, reading, or watching TV Not at all   Moving or speaking so slowly that other people could have noticed; or the opposite being so fidgety that others notice Not at all   Thoughts of being better off dead, or hurting yourself in some way Not at all   PHQ 9 Score 0   How difficult have these problems made it for you to do your work, take care of your home and get along with others Not difficult at all       Alcohol & Drug Abuse Risk Screen    Do you average more than 1 drink per night or more than 7 drinks a week: No    In the past three months have you have had more than 4 drinks containing alcohol on one occasion: No          Functional Ability and Level of Safety    Hearing:  He reports haivng a hearing test that was just a little below normal      Activities of Daily Living: The home contains: no safety equipment. Patient does total self care      Ambulation: with no difficulty     Fall Risk:  Fall Risk Assessment, last 12 mths 12/19/2022   Able to walk? Yes   Fall in past 12 months? 0   Do you feel unsteady?  0   Are you worried about falling 0   Number of falls in past 12 months -   Fall with injury? -      Abuse Screen:  Patient is not abused       Cognitive Screening    Has your family/caregiver stated any concerns about your memory: no     Cognitive Screening: Normal - Clock Drawing Test    Health Maintenance Due     Health Maintenance Due   Topic Date Due    Shingrix Vaccine Age 49> (1 of 2) Never done    COVID-19 Vaccine (2 - Moderna series) 04/25/2021    Flu Vaccine (1) 08/01/2022       Patient Care Team   Patient Care Team:  Rosa Shelton MD as PCP - General (Family Medicine)  Rosa Shelton MD as PCP - [facility]  Nikky Hernadez MD (Dermatology Physician)  Maxey Duverney, MD (Ophthalmology)    History     Patient Active Problem List   Diagnosis Code    Incomplete RBBB I45.10    Hypercholesterolemia E78.00    Benign essential hypertension I10    Prediabetes R73.03    Obesity E66.9    History of elevated PSA Z87.898    Benign prostatic hyperplasia with urinary frequency N40.1, R35.0    Gastroesophageal reflux disease without esophagitis K21.9     Past Medical History:   Diagnosis Date    Benign essential hypertension     History of elevated PSA     2.4 (11/2012), 3.6 (1/2014), 2.1 (3/2015) evaluated by Dr. Isabell Seaman, repeat PSA 1.9 (2/21/14); 2.1 (3/16/15), 2.4 (3/11/16), 2.7 (9/2016) - referred back to Dr. Ratna Laguna    Hypercholesterolemia     Incomplete RBBB 1/10/14    Obesity     Prediabetes 5/13/14      Past Surgical History:   Procedure Laterality Date    COLONOSCOPY N/A 11/16/2016    COLONOSCOPY performed by Sherryle Congo, MD at [facility] ENDOSCOPY    HX COLONOSCOPY  11/17/16    Dr. Williams Bee     Current Outpatient Medications   Medication Sig Dispense Refill    losartan (COZAAR) 50 mg tablet Take 1 Tablet by mouth daily. 90 Tablet 3    atorvastatin (LIPITOR) 10 mg tablet Take 1 Tablet by mouth daily. 90 Tablet 3    famotidine (PEPCID PO) Take  by mouth daily as needed.       cetirizine HCl (ZYRTEC PO) Take  by mouth.      psyllium seed-sucrose powd Take  by mouth. Use twice daily (Patient not taking: No sig reported)       No Known Allergies    Family History   Problem Relation Age of Onset    Stroke Mother     Diabetes Mother     Cancer Father         colon    Cancer Brother         bone    No Known Problems Son      Social History     Tobacco Use    Smoking status: Never    Smokeless tobacco: Never   Substance Use Topics    Alcohol use:  Yes     Alcohol/week: 0.0 standard drinks     Comment: rarely         Amira Garcia MD

## 2022-12-19 NOTE — PATIENT INSTRUCTIONS
Medicare Wellness Visit, Male    The best way to live healthy is to have a lifestyle where you eat a well-balanced diet, exercise regularly, limit alcohol use, and quit all forms of tobacco/nicotine, if applicable. Regular preventive services are another way to keep healthy. Preventive services (vaccines, screening tests, monitoring & exams) can help personalize your care plan, which helps you manage your own care. Screening tests can find health problems at the earliest stages, when they are easiest to treat. [facility] follows the current, evidence-based guidelines published by the [facility] ([facility]) when recommending preventive services for our patients. Because we follow these guidelines, sometimes recommendations change over time as research supports it. (For example, a prostate screening blood test is no longer routinely recommended for men with no symptoms). Of course, you and your doctor may decide to screen more often for some diseases, based on your risk and co-morbidities (chronic disease you are already diagnosed with). Preventive services for you include:  - Medicare offers their members a free annual wellness visit, which is time for you and your primary care provider to discuss and plan for your preventive service needs.  Take advantage of this benefit every year!    -Over the age of 72 should receive the recommended pneumonia vaccines.   -All adults should have a flu vaccine yearly.  -All adults should receive a tetanus vaccine every 10 years.  -Over the age of 48 should receive the shingles vaccines.    -All adults should be screened once for Hepatitis C.  -All adults age 38-68 who are overweight should have a diabetes screening test once every three years.   -Other screening tests & preventive services for persons with diabetes include: an eye exam to screen for diabetic retinopathy, a kidney function test, a foot exam, and stricter control over your cholesterol.   -Cardiovascular screening for adults with routine risk involves an electrocardiogram (ECG) at intervals determined by the provider.     -Colorectal cancer screening should be done for adults age 43-69 with no increased risk factors for colorectal cancer. There are a number of acceptable methods of screening for this type of cancer. Each test has its own benefits and drawbacks. Discuss with your provider what is most appropriate for you during your annual wellness visit. The different tests include: colonoscopy (considered the best screening method), a fecal occult blood test, a fecal DNA test, and sigmoidoscopy.    -Lung cancer screening is recommended annually with a low dose CT scan for adults between age 54 and 68, who have smoked at least 30 pack years (equivalent of 1 pack per day for 30 days), and who is a current smoker or quit less than 15 years ago. -An Abdominal Aortic Aneurysm (AAA) Screening is recommended for men age 73-68 who has ever smoked in their lifetime.      Here is a list of your current Health Maintenance items (your personalized list of preventive services) with a due date:  Health Maintenance Due   Topic Date Due    Shingles Vaccine (1 of 2) Never done    COVID-19 Vaccine (2 - Pb [place] series) 04/25/2021    Yearly Flu Vaccine (1) 08/01/2022

## 2022-12-20 LAB
ALBUMIN SERPL-MCNC: 4.8 G/DL (ref 3.5–5)
ALBUMIN/GLOB SERPL: 1.4 {RATIO} (ref 1.1–2.2)
ALP SERPL-CCNC: 71 U/L (ref 45–117)
ALT SERPL-CCNC: 39 U/L (ref 12–78)
ANION GAP SERPL CALC-SCNC: 6 MMOL/L (ref 5–15)
APPEARANCE UR: CLEAR
AST SERPL-CCNC: 23 U/L (ref 15–37)
BILIRUB SERPL-MCNC: 0.7 MG/DL (ref 0.2–1)
BILIRUB UR QL: NEGATIVE
BUN SERPL-MCNC: 19 MG/DL (ref 6–20)
BUN/CREAT SERPL: 15 (ref 12–20)
CALCIUM SERPL-MCNC: 9.8 MG/DL (ref 8.5–10.1)
CHLORIDE SERPL-SCNC: 103 MMOL/L (ref 97–108)
CHOLEST SERPL-MCNC: 188 MG/DL
CO2 SERPL-SCNC: 29 MMOL/L (ref 21–32)
COLOR UR: NORMAL
CREAT SERPL-MCNC: 1.3 MG/DL (ref 0.7–1.3)
ERYTHROCYTE [DISTWIDTH] IN BLOOD BY AUTOMATED COUNT: 12.6 % (ref 11.5–14.5)
EST. AVERAGE GLUCOSE BLD GHB EST-MCNC: 120 MG/DL
GLOBULIN SER CALC-MCNC: 3.5 G/DL (ref 2–4)
GLUCOSE SERPL-MCNC: 98 MG/DL (ref 65–100)
GLUCOSE UR STRIP.AUTO-MCNC: NEGATIVE MG/DL
HBA1C MFR BLD: 5.8 % (ref 4–5.6)
HCT VFR BLD AUTO: 47.6 % (ref 36.6–50.3)
HDLC SERPL-MCNC: 45 MG/DL
HDLC SERPL: 4.2 {RATIO} (ref 0–5)
HGB BLD-MCNC: 15.6 G/DL (ref 12.1–17)
HGB UR QL STRIP: NEGATIVE
KETONES UR QL STRIP.AUTO: NEGATIVE MG/DL
LDLC SERPL CALC-MCNC: 108 MG/DL (ref 0–100)
LEUKOCYTE ESTERASE UR QL STRIP.AUTO: NEGATIVE
MCH RBC QN AUTO: 31.5 PG (ref 26–34)
MCHC RBC AUTO-ENTMCNC: 32.8 G/DL (ref 30–36.5)
MCV RBC AUTO: 96.2 FL (ref 80–99)
NITRITE UR QL STRIP.AUTO: NEGATIVE
NRBC # BLD: 0 K/UL (ref 0–0.01)
NRBC BLD-RTO: 0 PER 100 WBC
PH UR STRIP: 7 [PH] (ref 5–8)
PLATELET # BLD AUTO: 295 K/UL (ref 150–400)
PMV BLD AUTO: 11 FL (ref 8.9–12.9)
POTASSIUM SERPL-SCNC: 4.2 MMOL/L (ref 3.5–5.1)
PROT SERPL-MCNC: 8.3 G/DL (ref 6.4–8.2)
PROT UR STRIP-MCNC: NEGATIVE MG/DL
RBC # BLD AUTO: 4.95 M/UL (ref 4.1–5.7)
SODIUM SERPL-SCNC: 138 MMOL/L (ref 136–145)
SP GR UR REFRACTOMETRY: 1.02 (ref 1–1.03)
TRIGL SERPL-MCNC: 175 MG/DL (ref ?–150)
UROBILINOGEN UR QL STRIP.AUTO: 0.2 EU/DL (ref 0.2–1)
VLDLC SERPL CALC-MCNC: 35 MG/DL
WBC # BLD AUTO: 7.6 K/UL (ref 4.1–11.1)

## 2023-01-10 NOTE — PROGRESS NOTES
Please let patient know that his kidney function is just a little low. He should avoid NSAIDs (nonsteroidal anti-inflammatory drugs) and ensure that he stays well hydrated. We will continue to monitor this. His A1c (average blood sugar) is slightly improved from last check (5.9 to 5.8). His triglycerides and LDL (bad cholesterol) are just a little elevated from last check. Please continue to work on minimizing excess carbohydrates and fried foods. The remainder of his labs are stable.

## 2023-01-11 ENCOUNTER — TELEPHONE (OUTPATIENT)
Dept: FAMILY MEDICINE CLINIC | Age: 73
End: 2023-01-11

## 2023-01-11 NOTE — TELEPHONE ENCOUNTER
Patient called asking for a better understanding of his lab results. He would like a call from Star [employer]ann[employer] himself as he said \"He was unable to understand the nurse\". He also wanted me to relay the message for him, and I informed him that I am unable to do so do to my employment status, and he did not take that lightly. Patient is hoping for a call back from Keenan, or someone he feels he can better understand.     Best call back number  [phone]  Or  [phone]

## 2023-01-11 NOTE — TELEPHONE ENCOUNTER
Spoke with patient, name and  verified. Patient stated my chart has all his labs normal so he did not understand the results notes. Writer helped explain notes to patient, he verbalized understanding.

## 2023-06-20 ENCOUNTER — OFFICE VISIT (OUTPATIENT)
Age: 73
End: 2023-06-20
Payer: MEDICARE

## 2023-06-20 VITALS
WEIGHT: 196.6 LBS | TEMPERATURE: 97.5 F | BODY MASS INDEX: 32.76 KG/M2 | OXYGEN SATURATION: 96 % | DIASTOLIC BLOOD PRESSURE: 70 MMHG | RESPIRATION RATE: 16 BRPM | HEIGHT: 65 IN | SYSTOLIC BLOOD PRESSURE: 130 MMHG | HEART RATE: 78 BPM

## 2023-06-20 DIAGNOSIS — R35.0 BENIGN PROSTATIC HYPERPLASIA WITH URINARY FREQUENCY: ICD-10-CM

## 2023-06-20 DIAGNOSIS — I10 ESSENTIAL (PRIMARY) HYPERTENSION: ICD-10-CM

## 2023-06-20 DIAGNOSIS — N40.1 BENIGN PROSTATIC HYPERPLASIA WITH URINARY FREQUENCY: ICD-10-CM

## 2023-06-20 DIAGNOSIS — R73.03 PREDIABETES: Primary | ICD-10-CM

## 2023-06-20 DIAGNOSIS — E78.00 PURE HYPERCHOLESTEROLEMIA, UNSPECIFIED: ICD-10-CM

## 2023-06-20 DIAGNOSIS — K21.9 GASTRO-ESOPHAGEAL REFLUX DISEASE WITHOUT ESOPHAGITIS: ICD-10-CM

## 2023-06-20 LAB
ALBUMIN SERPL-MCNC: 4.4 G/DL (ref 3.5–5)
ALBUMIN/GLOB SERPL: 1.3 (ref 1.1–2.2)
ALP SERPL-CCNC: 70 U/L (ref 45–117)
ALT SERPL-CCNC: 39 U/L (ref 12–78)
ANION GAP SERPL CALC-SCNC: 7 MMOL/L (ref 5–15)
AST SERPL-CCNC: 25 U/L (ref 15–37)
BILIRUB SERPL-MCNC: 0.7 MG/DL (ref 0.2–1)
BUN SERPL-MCNC: 20 MG/DL (ref 6–20)
BUN/CREAT SERPL: 17 (ref 12–20)
CALCIUM SERPL-MCNC: 9.4 MG/DL (ref 8.5–10.1)
CHLORIDE SERPL-SCNC: 104 MMOL/L (ref 97–108)
CO2 SERPL-SCNC: 27 MMOL/L (ref 21–32)
CREAT SERPL-MCNC: 1.21 MG/DL (ref 0.7–1.3)
EST. AVERAGE GLUCOSE BLD GHB EST-MCNC: 117 MG/DL
GLOBULIN SER CALC-MCNC: 3.4 G/DL (ref 2–4)
GLUCOSE SERPL-MCNC: 114 MG/DL (ref 65–100)
HBA1C MFR BLD: 5.7 % (ref 4–5.6)
POTASSIUM SERPL-SCNC: 4.2 MMOL/L (ref 3.5–5.1)
PROT SERPL-MCNC: 7.8 G/DL (ref 6.4–8.2)
SODIUM SERPL-SCNC: 138 MMOL/L (ref 136–145)

## 2023-06-20 PROCEDURE — G8427 DOCREV CUR MEDS BY ELIG CLIN: HCPCS | Performed by: STUDENT IN AN ORGANIZED HEALTH CARE EDUCATION/TRAINING PROGRAM

## 2023-06-20 PROCEDURE — 1123F ACP DISCUSS/DSCN MKR DOCD: CPT | Performed by: STUDENT IN AN ORGANIZED HEALTH CARE EDUCATION/TRAINING PROGRAM

## 2023-06-20 PROCEDURE — 3078F DIAST BP <80 MM HG: CPT | Performed by: STUDENT IN AN ORGANIZED HEALTH CARE EDUCATION/TRAINING PROGRAM

## 2023-06-20 PROCEDURE — G8419 CALC BMI OUT NRM PARAM NOF/U: HCPCS | Performed by: STUDENT IN AN ORGANIZED HEALTH CARE EDUCATION/TRAINING PROGRAM

## 2023-06-20 PROCEDURE — 1036F TOBACCO NON-USER: CPT | Performed by: STUDENT IN AN ORGANIZED HEALTH CARE EDUCATION/TRAINING PROGRAM

## 2023-06-20 PROCEDURE — 99214 OFFICE O/P EST MOD 30 MIN: CPT | Performed by: STUDENT IN AN ORGANIZED HEALTH CARE EDUCATION/TRAINING PROGRAM

## 2023-06-20 PROCEDURE — 3075F SYST BP GE 130 - 139MM HG: CPT | Performed by: STUDENT IN AN ORGANIZED HEALTH CARE EDUCATION/TRAINING PROGRAM

## 2023-06-20 PROCEDURE — 3017F COLORECTAL CA SCREEN DOC REV: CPT | Performed by: STUDENT IN AN ORGANIZED HEALTH CARE EDUCATION/TRAINING PROGRAM

## 2023-06-20 SDOH — ECONOMIC STABILITY: INCOME INSECURITY: HOW HARD IS IT FOR YOU TO PAY FOR THE VERY BASICS LIKE FOOD, HOUSING, MEDICAL CARE, AND HEATING?: NOT HARD AT ALL

## 2023-06-20 SDOH — ECONOMIC STABILITY: FOOD INSECURITY: WITHIN THE PAST 12 MONTHS, YOU WORRIED THAT YOUR FOOD WOULD RUN OUT BEFORE YOU GOT MONEY TO BUY MORE.: NEVER TRUE

## 2023-06-20 SDOH — ECONOMIC STABILITY: HOUSING INSECURITY
IN THE LAST 12 MONTHS, WAS THERE A TIME WHEN YOU DID NOT HAVE A STEADY PLACE TO SLEEP OR SLEPT IN A SHELTER (INCLUDING NOW)?: NO

## 2023-06-20 SDOH — ECONOMIC STABILITY: FOOD INSECURITY: WITHIN THE PAST 12 MONTHS, THE FOOD YOU BOUGHT JUST DIDN'T LAST AND YOU DIDN'T HAVE MONEY TO GET MORE.: NEVER TRUE

## 2023-06-20 ASSESSMENT — PATIENT HEALTH QUESTIONNAIRE - PHQ9
SUM OF ALL RESPONSES TO PHQ QUESTIONS 1-9: 0
SUM OF ALL RESPONSES TO PHQ QUESTIONS 1-9: 0
2. FEELING DOWN, DEPRESSED OR HOPELESS: 0
SUM OF ALL RESPONSES TO PHQ QUESTIONS 1-9: 0
SUM OF ALL RESPONSES TO PHQ9 QUESTIONS 1 & 2: 0
SUM OF ALL RESPONSES TO PHQ QUESTIONS 1-9: 0
1. LITTLE INTEREST OR PLEASURE IN DOING THINGS: 0

## 2023-06-20 NOTE — PROGRESS NOTES
[facility]      Chief Complaint:     Chief Complaint   Patient presents with    Establish Care    Follow-up Chronic Condition       Siena Barrett is a 67 y.o. male that presents for: establish care      Assessment/Plan:     BP elevated today, better on recheck. He does report eating wings yesterday when he usually keep low-salt diet. He did take his losartan this morning. Neeraj Vergara was seen today for establish care and follow-up chronic condition. Diagnoses and all orders for this visit:    Prediabetes  -     Hemoglobin A1C; Future    Essential (primary) hypertension  -     Comprehensive Metabolic Panel; Future    Benign prostatic hyperplasia with urinary frequency    Pure hypercholesterolemia, unspecified    Gastro-esophageal reflux disease without esophagitis           Follow up:     Return medicare wellness visit after 12/19/23. Subjective:   HPI:  Siena Barrett is a 67 y.o. male that presents for: establish care    Previous PCP: Johnson Emperor  Wife just had knee surgery    CC: Had wings yesterday, but did take BP meds today  Denies HA, change in vision, chest pain, sob, or leg swelling. MedHx    HTN: not checking BP at home, continues Losartan 50mg. He reports that he does not each much salt  HLD: continues Lipitor 10mg qhs  Prediabetes: last A1c was 5.8 on 12/19/22; he minimizes his sugar intake  GERD: takes Pepcid PRN. He reports that it depends on the day. Frequent Urination, BPH, Elevated PSA: he reports that he last saw urology (Dr. Wilfredo Mcconnell) last year with PSA of 4.7 and plan to follow up in December  Allergies: he reports as well controlled with Zyrtec       Screenings  He declines referral to eye dotor     Immunizations:  COVID Vaccine: he reports last booster about 6 weeks ago  Shingles Vacine: he reports having the old vaccine 10 years ago at [facility].  He is not interested in Shingrix vaccine  Flu Vaccine    Social Hx:  Diet: tries to avoid salt   Exercise: walks

## 2023-06-20 NOTE — PROGRESS NOTES
Chief Complaint   Patient presents with    Establish Care    Follow-up Chronic Condition         1. \"Have you been to the ER, urgent care clinic since your last visit? Hospitalized since your last visit? \" No    2. \"Have you seen or consulted any other health care providers outside of the [address] [place] [address] since your last visit? \" No     3. For patients over 39: Has the patient had a colorectal cancer screening? Yes     If the patient is female:    4. For patients over 40: Has the patient had a mammogram? N/A    5. For patients over 21: Has the patient had a pap smear?  N/A     PHQ-9 Total Score: 0 (6/20/2023  9:19 AM)      Health Maintenance Due   Topic Date Due    Hepatitis C screen  Never done    Shingles vaccine (2 of 3) 01/26/2015    COVID-19 Vaccine (2 - Booster for Moderna series) 05/23/2021    Prostate Specific Antigen (PSA) Screening or Monitoring  06/14/2023

## 2023-09-19 NOTE — TELEPHONE ENCOUNTER
Receiving refill request from August for refill losartan 50mg and atorvastatin 10mg. Noted patient last rx 12/19/22 for 90 + 3 refills which should last until 12/2023. Contacted [employer] for refill dates:  12/24/22, 3/2/23, 5/10/23 and 7/17/23. Patient has been filling about every 2 months. No noted change in dose. Contacted patient. States he has some left but not enough to get thru til December appt. Ramiro, Neida    Last appointment: 6/20/23 MD Damon English, lipid 12/2022  Next appointment: 12/21/23 Damon English  Previous refill encounter(s): 12/19/22 90 + 3 (both)    Requested Prescriptions     Pending Prescriptions Disp Refills    atorvastatin (LIPITOR) 10 MG tablet 90 tablet 0     Sig: Take 1 tablet by mouth daily    losartan (COZAAR) 50 MG tablet 90 tablet 0     Sig: Take 1 tablet by mouth daily     For Pharmacy Admin Tracking Only    Program: Medication Refill  CPA in place:    Recommendation Provided To:    Intervention Detail: New Rx: 2, reason: Patient Preference  Intervention Accepted By:   Sandeep Vega Closed?:    Time Spent (min): 10

## 2023-09-21 RX ORDER — LOSARTAN POTASSIUM 50 MG/1
50 TABLET ORAL DAILY
Qty: 90 TABLET | Refills: 1 | Status: SHIPPED | OUTPATIENT
Start: 2023-09-21

## 2023-09-21 RX ORDER — ATORVASTATIN CALCIUM 10 MG/1
10 TABLET, FILM COATED ORAL DAILY
Qty: 90 TABLET | Refills: 1 | Status: SHIPPED | OUTPATIENT
Start: 2023-09-21

## 2023-12-20 ENCOUNTER — TELEPHONE (OUTPATIENT)
Age: 73
End: 2023-12-20

## 2023-12-20 NOTE — TELEPHONE ENCOUNTER
Pt called requesting a call back says he would like to be seen sooner than 2/2024 with PCP or if he can be seen with a different PCP at [facility] for AWV. Pt also wanted to know if he can do his labs before the year is out and be seen for AWV with next available date he will be offered. Best call back number [phone]

## 2023-12-20 NOTE — TELEPHONE ENCOUNTER
Left voicemail on 12.19.23 and 12.20.23 also sent pt a My Chart message to callback the office to re-schedule there 12.21.23 appt with  due to  being out of the office.When pt calls back please give next available appt.After two attempt's on 12.19.23 and 12.20.23 I have taken the pt off of the schedule.

## 2023-12-20 NOTE — TELEPHONE ENCOUNTER
Spoke to pt on 12/20/23 I did inform him that 's next [facility] Appt is not until 2/2024 and that all of the other provider's are booked out to February as well.Pt's scheduled to see  on 2/12/24 @ 8:40 AM.

## 2024-01-02 ENCOUNTER — OFFICE VISIT (OUTPATIENT)
Age: 74
End: 2024-01-02
Payer: MEDICARE

## 2024-01-02 VITALS
SYSTOLIC BLOOD PRESSURE: 144 MMHG | OXYGEN SATURATION: 100 % | RESPIRATION RATE: 16 BRPM | WEIGHT: 199.8 LBS | HEART RATE: 68 BPM | BODY MASS INDEX: 33.29 KG/M2 | HEIGHT: 65 IN | TEMPERATURE: 97.3 F | DIASTOLIC BLOOD PRESSURE: 83 MMHG
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
## 2024-01-02 DIAGNOSIS — Z00.00 MEDICARE ANNUAL WELLNESS VISIT, SUBSEQUENT: ICD-10-CM

## 2024-01-02 DIAGNOSIS — I10 BENIGN ESSENTIAL HYPERTENSION: ICD-10-CM

## 2024-01-02 DIAGNOSIS — Z12.5 PROSTATE CANCER SCREENING: ICD-10-CM

## 2024-01-02 DIAGNOSIS — E78.00 HYPERCHOLESTEROLEMIA: ICD-10-CM

## 2024-01-02 DIAGNOSIS — Z87.898 HISTORY OF ELEVATED PSA: ICD-10-CM

## 2024-01-02 DIAGNOSIS — R73.03 PREDIABETES: ICD-10-CM

## 2024-01-02 DIAGNOSIS — K21.9 GASTROESOPHAGEAL REFLUX DISEASE WITHOUT ESOPHAGITIS: ICD-10-CM

## 2024-01-02 DIAGNOSIS — Z00.00 MEDICARE ANNUAL WELLNESS VISIT, SUBSEQUENT: Primary | ICD-10-CM

## 2024-01-02 LAB
ALBUMIN SERPL-MCNC: 4.3 G/DL (ref 3.5–5)
ALBUMIN/GLOB SERPL: 1.2 (ref 1.1–2.2)
ALP SERPL-CCNC: 75 U/L (ref 45–117)
ALT SERPL-CCNC: 70 U/L (ref 12–78)
ANION GAP SERPL CALC-SCNC: 4 MMOL/L (ref 5–15)
AST SERPL-CCNC: 38 U/L (ref 15–37)
BILIRUB SERPL-MCNC: 0.4 MG/DL (ref 0.2–1)
BUN SERPL-MCNC: 12 MG/DL (ref 6–20)
BUN/CREAT SERPL: 12 (ref 12–20)
CALCIUM SERPL-MCNC: 8.6 MG/DL (ref 8.5–10.1)
CHLORIDE SERPL-SCNC: 107 MMOL/L (ref 97–108)
CHOLEST SERPL-MCNC: 143 MG/DL
CO2 SERPL-SCNC: 27 MMOL/L (ref 21–32)
CREAT SERPL-MCNC: 1.01 MG/DL (ref 0.7–1.3)
GLOBULIN SER CALC-MCNC: 3.6 G/DL (ref 2–4)
GLUCOSE SERPL-MCNC: 94 MG/DL (ref 65–100)
HDLC SERPL-MCNC: 35 MG/DL
HDLC SERPL: 4.1 (ref 0–5)
LDLC SERPL CALC-MCNC: 57.4 MG/DL (ref 0–100)
POTASSIUM SERPL-SCNC: 4.5 MMOL/L (ref 3.5–5.1)
PROT SERPL-MCNC: 7.9 G/DL (ref 6.4–8.2)
SODIUM SERPL-SCNC: 138 MMOL/L (ref 136–145)
TRIGL SERPL-MCNC: 253 MG/DL
VLDLC SERPL CALC-MCNC: 50.6 MG/DL

## 2024-01-02 PROCEDURE — G8427 DOCREV CUR MEDS BY ELIG CLIN: HCPCS | Performed by: STUDENT IN AN ORGANIZED HEALTH CARE EDUCATION/TRAINING PROGRAM

## 2024-01-02 PROCEDURE — 1123F ACP DISCUSS/DSCN MKR DOCD: CPT | Performed by: STUDENT IN AN ORGANIZED HEALTH CARE EDUCATION/TRAINING PROGRAM

## 2024-01-02 PROCEDURE — G0439 PPPS, SUBSEQ VISIT: HCPCS | Performed by: STUDENT IN AN ORGANIZED HEALTH CARE EDUCATION/TRAINING PROGRAM

## 2024-01-02 PROCEDURE — G8484 FLU IMMUNIZE NO ADMIN: HCPCS | Performed by: STUDENT IN AN ORGANIZED HEALTH CARE EDUCATION/TRAINING PROGRAM

## 2024-01-02 PROCEDURE — G8417 CALC BMI ABV UP PARAM F/U: HCPCS | Performed by: STUDENT IN AN ORGANIZED HEALTH CARE EDUCATION/TRAINING PROGRAM

## 2024-01-02 PROCEDURE — 99214 OFFICE O/P EST MOD 30 MIN: CPT | Performed by: STUDENT IN AN ORGANIZED HEALTH CARE EDUCATION/TRAINING PROGRAM

## 2024-01-02 PROCEDURE — 3017F COLORECTAL CA SCREEN DOC REV: CPT | Performed by: STUDENT IN AN ORGANIZED HEALTH CARE EDUCATION/TRAINING PROGRAM

## 2024-01-02 PROCEDURE — 1036F TOBACCO NON-USER: CPT | Performed by: STUDENT IN AN ORGANIZED HEALTH CARE EDUCATION/TRAINING PROGRAM

## 2024-01-02 PROCEDURE — 3077F SYST BP >= 140 MM HG: CPT | Performed by: STUDENT IN AN ORGANIZED HEALTH CARE EDUCATION/TRAINING PROGRAM

## 2024-01-02 PROCEDURE — 3079F DIAST BP 80-89 MM HG: CPT | Performed by: STUDENT IN AN ORGANIZED HEALTH CARE EDUCATION/TRAINING PROGRAM

## 2024-01-02 RX ORDER — TAMSULOSIN HYDROCHLORIDE 0.4 MG/1
0.4 CAPSULE ORAL DAILY
COMMUNITY

## 2024-01-02 ASSESSMENT — PATIENT HEALTH QUESTIONNAIRE - PHQ9
SUM OF ALL RESPONSES TO PHQ9 QUESTIONS 1 & 2: 0
SUM OF ALL RESPONSES TO PHQ QUESTIONS 1-9: 0
2. FEELING DOWN, DEPRESSED OR HOPELESS: 0
SUM OF ALL RESPONSES TO PHQ QUESTIONS 1-9: 0
1. LITTLE INTEREST OR PLEASURE IN DOING THINGS: 0
SUM OF ALL RESPONSES TO PHQ QUESTIONS 1-9: 0
SUM OF ALL RESPONSES TO PHQ QUESTIONS 1-9: 0

## 2024-01-02 ASSESSMENT — LIFESTYLE VARIABLES
HOW OFTEN DO YOU HAVE A DRINK CONTAINING ALCOHOL: NEVER
HOW MANY STANDARD DRINKS CONTAINING ALCOHOL DO YOU HAVE ON A TYPICAL DAY: PATIENT DOES NOT DRINK

## 2024-01-02 NOTE — PATIENT INSTRUCTIONS
Starting a Weight Loss Plan: Care Instructions  Overview     If you're thinking about losing weight, it can be hard to know where to start. Your doctor can help you set up a weight loss plan that best meets your needs. You may want to take a class on nutrition or exercise, or you could join a weight loss support group. If you have questions about how to make changes to your eating or exercise habits, ask your doctor about seeing a registered dietitian or an exercise specialist.  It can be a big challenge to lose weight. But you don't have to make huge changes at once. Make small changes, and stick with them. When those changes become habit, add a few more changes.  If you don't think you're ready to make changes right now, try to pick a date in the future. Make an appointment to see your doctor to discuss whether the time is right for you to start a plan.  Follow-up care is a key part of your treatment and safety. Be sure to make and go to all appointments, and call your doctor if you are having problems. It's also a good idea to know your test results and keep a list of the medicines you take.  How can you care for yourself at home?  Set realistic goals. Many people expect to lose much more weight than is likely. A weight loss of 5% to 10% of your body weight may be enough to improve your health.  Get family and friends involved to provide support. Talk to them about why you are trying to lose weight, and ask them to help. They can help by participating in exercise and having meals with you, even if they may be eating something different.  Find what works best for you. If you do not have time or do not like to cook, a program that offers meal replacement bars or shakes may be better for you. Or if you like to prepare meals, finding a plan that includes daily menus and recipes may be best.  Ask your doctor about other health professionals who can help you achieve your weight loss goals.  A dietitian can help

## 2024-01-02 NOTE — PROGRESS NOTES
Chief Complaint   Patient presents with    Medicare AWV     \"Have you been to the ER, urgent care clinic since your last visit?  Hospitalized since your last visit?\"    NO    “Have you seen or consulted any other health care providers outside of [facility] since your last visit?”    NO                   1/2/2024     1:49 PM   PHQ-9    Little interest or pleasure in doing things 0   Feeling down, depressed, or hopeless 0   PHQ-2 Score 0   PHQ-9 Total Score 0           Financial Resource Strain: Low Risk  (6/20/2023)    Overall Financial Resource Strain (CARDIA)     Difficulty of Paying Living Expenses: Not hard at all      Food Insecurity: Not on file (6/20/2023)          Health Maintenance Due   Topic Date Due    Hepatitis C screen  Never done    Respiratory Syncytial Virus (RSV) Pregnant or age 60 yrs+ (1 - 1-dose 60+ series) Never done    Shingles vaccine (2 of 3) 01/26/2015    Prostate Specific Antigen (PSA) Screening or Monitoring  06/14/2023    Flu vaccine (1) 08/01/2023    COVID-19 Vaccine (2 - 2023-24 season) 09/01/2023    Annual Wellness Visit (Medicare)  12/20/2023    Lipids  12/19/2023

## 2024-01-02 NOTE — PROGRESS NOTES
Medicare Annual Wellness Visit    Cory Lozano is here for Medicare AWV    Assessment & Plan   Medicare annual wellness visit, subsequent  -     CBC with Auto Differential; Future  Benign essential hypertension  -     Hemoglobin A1C; Future  -     Comprehensive Metabolic Panel; Future  Gastroesophageal reflux disease without esophagitis  -     CBC with Auto Differential; Future  Prediabetes  -     Hemoglobin A1C; Future  Hypercholesterolemia  -     Lipid Panel; Future  -     Comprehensive Metabolic Panel; Future  History of elevated PSA  Prostate cancer screening  Fall, initial encounter     Recommendations for Preventive Services Due: see orders and patient instructions/AVS.  Recommended screening schedule for the next 5-10 years is provided to the patient in written form: see Patient Instructions/AVS.     Return in about 6 months (around 7/2/2024) for chronic condition follow up.     Subjective     CC:  Fell inside of his closet 6 days ago, paramedics got him back into bed   No other falls . GLF. Didn't hit his head.   Blames it on eating popcorn at movies ; got dehydrated from diarrhea     MedHx     HTN: not checking BP at home, continues Losartan 50mg. He reports that he does not each much salt  HLD: continues Lipitor 10mg qhs  Prediabetes: last A1c was 5.7  he minimizes his sugar intake  GERD: takes Pepcid PRN. He reports that it depends on the day.   Frequent Urination, BPH, Elevated PSA: he reports that he last saw urology (Dr. Muhammad) last year with PSA of 4.7 and plan to follow up in December. Taking Flomax.   Allergies: he reports as well controlled with Zyrtec        Screenings  He declines referral to eye dotor     Immunizations:  COVID Vaccine: he reports last booster about 6 weeks ago  Shingles Vacine: he reports having the old vaccine 10 years ago at [facility]. He is not interested in Shingrix vaccine  Flu Vaccine    Patient's complete Health Risk Assessment and screening values have been reviewed

## 2024-01-03 LAB
BASOPHILS # BLD: 0 K/UL (ref 0–0.1)
BASOPHILS NFR BLD: 1 % (ref 0–1)
DIFFERENTIAL METHOD BLD: ABNORMAL
EOSINOPHIL # BLD: 0.4 K/UL (ref 0–0.4)
EOSINOPHIL NFR BLD: 6 % (ref 0–7)
ERYTHROCYTE [DISTWIDTH] IN BLOOD BY AUTOMATED COUNT: 12.4 % (ref 11.5–14.5)
EST. AVERAGE GLUCOSE BLD GHB EST-MCNC: 120 MG/DL
HBA1C MFR BLD: 5.8 % (ref 4–5.6)
HCT VFR BLD AUTO: 43 % (ref 36.6–50.3)
HGB BLD-MCNC: 14.5 G/DL (ref 12.1–17)
IMM GRANULOCYTES # BLD AUTO: 0 K/UL (ref 0–0.04)
IMM GRANULOCYTES NFR BLD AUTO: 1 % (ref 0–0.5)
LYMPHOCYTES # BLD: 1.9 K/UL (ref 0.8–3.5)
LYMPHOCYTES NFR BLD: 30 % (ref 12–49)
MCH RBC QN AUTO: 30.9 PG (ref 26–34)
MCHC RBC AUTO-ENTMCNC: 33.7 G/DL (ref 30–36.5)
MCV RBC AUTO: 91.5 FL (ref 80–99)
MONOCYTES # BLD: 0.3 K/UL (ref 0–1)
MONOCYTES NFR BLD: 5 % (ref 5–13)
NEUTS SEG # BLD: 3.8 K/UL (ref 1.8–8)
NEUTS SEG NFR BLD: 57 % (ref 32–75)
NRBC # BLD: 0 K/UL (ref 0–0.01)
NRBC BLD-RTO: 0 PER 100 WBC
PLATELET # BLD AUTO: 295 K/UL (ref 150–400)
PMV BLD AUTO: 11.1 FL (ref 8.9–12.9)
RBC # BLD AUTO: 4.7 M/UL (ref 4.1–5.7)
WBC # BLD AUTO: 6.5 K/UL (ref 4.1–11.1)

## 2024-03-18 RX ORDER — ATORVASTATIN CALCIUM 10 MG/1
10 TABLET, FILM COATED ORAL DAILY
Qty: 90 TABLET | Refills: 1 | Status: SHIPPED | OUTPATIENT
Start: 2024-03-18

## 2024-03-18 NOTE — TELEPHONE ENCOUNTER
PCP: Ilsa Urbina DO      Future Appointments   Date Time Provider Department Center   7/2/2024  9:00 AM Ilsa Urbina DO [facility]     Last seen: 1/2/2024    Requested Prescriptions     Pending Prescriptions Disp Refills    atorvastatin (LIPITOR) 10 MG tablet [Pharmacy Med Name: Atorvastatin Calcium Oral Tablet 10 MG] 90 tablet 0     Sig: TAKE 1 TABLET BY MOUTH ONE TIME DAILY

## 2024-07-02 ENCOUNTER — OFFICE VISIT (OUTPATIENT)
Age: 74
End: 2024-07-02
Payer: MEDICARE

## 2024-07-02 VITALS
RESPIRATION RATE: 14 BRPM | BODY MASS INDEX: 33.39 KG/M2 | OXYGEN SATURATION: 94 % | TEMPERATURE: 97 F | HEIGHT: 65 IN | SYSTOLIC BLOOD PRESSURE: 133 MMHG | WEIGHT: 200.4 LBS | DIASTOLIC BLOOD PRESSURE: 81 MMHG | HEART RATE: 71 BPM

## 2024-07-02 DIAGNOSIS — E78.00 HYPERCHOLESTEROLEMIA: ICD-10-CM

## 2024-07-02 DIAGNOSIS — I10 BENIGN ESSENTIAL HYPERTENSION: Primary | ICD-10-CM

## 2024-07-02 PROCEDURE — G8417 CALC BMI ABV UP PARAM F/U: HCPCS | Performed by: STUDENT IN AN ORGANIZED HEALTH CARE EDUCATION/TRAINING PROGRAM

## 2024-07-02 PROCEDURE — 99214 OFFICE O/P EST MOD 30 MIN: CPT | Performed by: STUDENT IN AN ORGANIZED HEALTH CARE EDUCATION/TRAINING PROGRAM

## 2024-07-02 PROCEDURE — G8427 DOCREV CUR MEDS BY ELIG CLIN: HCPCS | Performed by: STUDENT IN AN ORGANIZED HEALTH CARE EDUCATION/TRAINING PROGRAM

## 2024-07-02 PROCEDURE — G2211 COMPLEX E/M VISIT ADD ON: HCPCS | Performed by: STUDENT IN AN ORGANIZED HEALTH CARE EDUCATION/TRAINING PROGRAM

## 2024-07-02 PROCEDURE — 3017F COLORECTAL CA SCREEN DOC REV: CPT | Performed by: STUDENT IN AN ORGANIZED HEALTH CARE EDUCATION/TRAINING PROGRAM

## 2024-07-02 PROCEDURE — 1036F TOBACCO NON-USER: CPT | Performed by: STUDENT IN AN ORGANIZED HEALTH CARE EDUCATION/TRAINING PROGRAM

## 2024-07-02 PROCEDURE — 1123F ACP DISCUSS/DSCN MKR DOCD: CPT | Performed by: STUDENT IN AN ORGANIZED HEALTH CARE EDUCATION/TRAINING PROGRAM

## 2024-07-02 PROCEDURE — 3075F SYST BP GE 130 - 139MM HG: CPT | Performed by: STUDENT IN AN ORGANIZED HEALTH CARE EDUCATION/TRAINING PROGRAM

## 2024-07-02 PROCEDURE — 3079F DIAST BP 80-89 MM HG: CPT | Performed by: STUDENT IN AN ORGANIZED HEALTH CARE EDUCATION/TRAINING PROGRAM

## 2024-07-02 RX ORDER — ATORVASTATIN CALCIUM 10 MG/1
10 TABLET, FILM COATED ORAL DAILY
Qty: 90 TABLET | Refills: 3 | Status: SHIPPED | OUTPATIENT
Start: 2024-07-02

## 2024-07-02 RX ORDER — LOSARTAN POTASSIUM 50 MG/1
50 TABLET ORAL DAILY
Qty: 90 TABLET | Refills: 3 | Status: SHIPPED | OUTPATIENT
Start: 2024-07-02

## 2024-07-02 SDOH — ECONOMIC STABILITY: FOOD INSECURITY: WITHIN THE PAST 12 MONTHS, THE FOOD YOU BOUGHT JUST DIDN'T LAST AND YOU DIDN'T HAVE MONEY TO GET MORE.: NEVER TRUE

## 2024-07-02 SDOH — ECONOMIC STABILITY: FOOD INSECURITY: WITHIN THE PAST 12 MONTHS, YOU WORRIED THAT YOUR FOOD WOULD RUN OUT BEFORE YOU GOT MONEY TO BUY MORE.: NEVER TRUE

## 2024-07-02 SDOH — ECONOMIC STABILITY: INCOME INSECURITY: HOW HARD IS IT FOR YOU TO PAY FOR THE VERY BASICS LIKE FOOD, HOUSING, MEDICAL CARE, AND HEATING?: NOT HARD AT ALL

## 2024-07-02 NOTE — PATIENT INSTRUCTIONS
Dr. Antonino Ortiz or Dr. Sergio Sheffield starting here in August     STOP Atorvastatin and recheck cholesterol next visit

## 2024-07-02 NOTE — PROGRESS NOTES
Chief Complaint   Patient presents with    Hypertension     6 month follow up    Cholesterol Problem     6 month follow up     \"Have you been to the ER, urgent care clinic since your last visit?  Hospitalized since your last visit?\"    NO    “Have you seen or consulted any other health care providers outside of [facility] since your last visit?”    NO                   1/2/2024     1:49 PM   PHQ-9    Little interest or pleasure in doing things 0   Feeling down, depressed, or hopeless 0   PHQ-2 Score 0   PHQ-9 Total Score 0           Financial Resource Strain: Low Risk  (7/2/2024)    Overall Financial Resource Strain (CARDIA)     Difficulty of Paying Living Expenses: Not hard at all      Food Insecurity: No Food Insecurity (7/2/2024)    Hunger Vital Sign     Worried About Running Out of Food in the Last Year: Never true     Ran Out of Food in the Last Year: Never true          Health Maintenance Due   Topic Date Due    Hepatitis C screen  Never done    Shingles vaccine (2 of 3) 01/26/2015    Prostate Specific Antigen (PSA) Screening or Monitoring  06/14/2023    COVID-19 Vaccine (3 - 2023-24 season) 11/10/2023       10

## 2024-07-02 NOTE — PROGRESS NOTES
[facility] PRACTICE      Chief Complaint:     Chief Complaint   Patient presents with    Hypertension     6 month follow up    Cholesterol Problem     6 month follow up       Cory Lozano is a 73 y.o. male that presents for: FU      Assessment/Plan:     Cory was seen today for hypertension and cholesterol problem.    Diagnoses and all orders for this visit:    Benign essential hypertension  -     losartan (COZAAR) 50 MG tablet; Take 1 tablet by mouth daily    Hypercholesterolemia  -     atorvastatin (LIPITOR) 10 MG tablet; Take 1 tablet by mouth daily           Follow up:     Return in about 3 months (around 10/2/2024) for cholesterol.    Subjective:   HPI:  Cory Lozano is a 73 y.o. 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      MedHx     HTN: not checking BP at home, continues Losartan 50mg. He reports that he does not each much salt  HLD: continues Lipitor 10mg qhs, last LDL 57>> stop atorvastatin and recheck lipid panel in ~3 months  Prediabetes: last A1c was 5.7  he minimizes his sugar intake  GERD: takes Pepcid PRN. He reports that it depends on the day.   Frequent Urination, BPH, Elevated PSA: he reports that he last saw urology (Dr. Muhammad) last year with PSA of 4.7 and plan to follow up in December. Taking Flomax.   Allergies: he reports as well controlled with Zyrtec    Health Maintenance:  Health Maintenance Due   Topic Date Due    Hepatitis C screen  Never done    Shingles vaccine (2 of 3) 01/26/2015    Prostate Specific Antigen (PSA) Screening or Monitoring  06/14/2023    COVID-19 Vaccine (3 - 2023-24 season) 11/10/2023        ROS:   See HPI      Past medical history, social history, and medications personally reviewed.      Allergies personally reviewed.  No Known Allergies

## 2024-08-25 ENCOUNTER — HOSPITAL ENCOUNTER (EMERGENCY)
Facility: HOSPITAL | Age: 74
Discharge: HOME | End: 2024-08-25
Attending: EMERGENCY MEDICINE
Payer: MEDICARE

## 2024-08-25 ENCOUNTER — APPOINTMENT (OUTPATIENT)
Facility: HOSPITAL | Age: 74
End: 2024-08-25
Payer: MEDICARE

## 2024-08-25 VITALS
OXYGEN SATURATION: 94 % | HEART RATE: 70 BPM | WEIGHT: 204.15 LBS | DIASTOLIC BLOOD PRESSURE: 63 MMHG | RESPIRATION RATE: 20 BRPM | BODY MASS INDEX: 34.5 KG/M2 | SYSTOLIC BLOOD PRESSURE: 113 MMHG | TEMPERATURE: 98.2 F

## 2024-08-25 DIAGNOSIS — U07.1 COVID-19 VIRUS INFECTION: Primary | ICD-10-CM
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
## 2024-08-25 DIAGNOSIS — N28.9 RENAL INSUFFICIENCY: ICD-10-CM

## 2024-08-25 LAB
ANION GAP SERPL CALC-SCNC: 10 MMOL/L (ref 5–15)
BASOPHILS # BLD: 0 K/UL (ref 0–0.1)
BASOPHILS NFR BLD: 0 % (ref 0–1)
BUN SERPL-MCNC: 16 MG/DL (ref 6–20)
BUN/CREAT SERPL: 12 (ref 12–20)
CALCIUM SERPL-MCNC: 8.8 MG/DL (ref 8.5–10.1)
CHLORIDE SERPL-SCNC: 99 MMOL/L (ref 97–108)
CO2 SERPL-SCNC: 26 MMOL/L (ref 21–32)
CREAT SERPL-MCNC: 1.34 MG/DL (ref 0.7–1.3)
DIFFERENTIAL METHOD BLD: ABNORMAL
EKG ATRIAL RATE: 84 BPM
EKG DIAGNOSIS: NORMAL
EKG P AXIS: 39 DEGREES
EKG P-R INTERVAL: 138 MS
EKG Q-T INTERVAL: 374 MS
EKG QRS DURATION: 86 MS
EKG QTC CALCULATION (BAZETT): 441 MS
EKG R AXIS: -9 DEGREES
EKG T AXIS: 13 DEGREES
EKG VENTRICULAR RATE: 84 BPM
EOSINOPHIL # BLD: 0.1 K/UL (ref 0–0.4)
EOSINOPHIL NFR BLD: 1 % (ref 0–7)
ERYTHROCYTE [DISTWIDTH] IN BLOOD BY AUTOMATED COUNT: 13 % (ref 11.5–14.5)
FLUAV RNA SPEC QL NAA+PROBE: NOT DETECTED
FLUBV RNA SPEC QL NAA+PROBE: NOT DETECTED
GLUCOSE SERPL-MCNC: 130 MG/DL (ref 65–100)
HCT VFR BLD AUTO: 39.9 % (ref 36.6–50.3)
HGB BLD-MCNC: 13.8 G/DL (ref 12.1–17)
IMM GRANULOCYTES # BLD AUTO: 0 K/UL (ref 0–0.04)
IMM GRANULOCYTES NFR BLD AUTO: 0 % (ref 0–0.5)
LYMPHOCYTES # BLD: 0.5 K/UL (ref 0.8–3.5)
LYMPHOCYTES NFR BLD: 5 % (ref 12–49)
MCH RBC QN AUTO: 30.4 PG (ref 26–34)
MCHC RBC AUTO-ENTMCNC: 34.6 G/DL (ref 30–36.5)
MCV RBC AUTO: 87.9 FL (ref 80–99)
MONOCYTES # BLD: 0.6 K/UL (ref 0–1)
MONOCYTES NFR BLD: 6 % (ref 5–13)
NEUTS SEG # BLD: 8.7 K/UL (ref 1.8–8)
NEUTS SEG NFR BLD: 87 % (ref 32–75)
NRBC # BLD: 0 K/UL (ref 0–0.01)
NRBC BLD-RTO: 0 PER 100 WBC
PLATELET # BLD AUTO: 236 K/UL (ref 150–400)
PMV BLD AUTO: 10.3 FL (ref 8.9–12.9)
POTASSIUM SERPL-SCNC: 4.1 MMOL/L (ref 3.5–5.1)
RBC # BLD AUTO: 4.54 M/UL (ref 4.1–5.7)
SARS-COV-2 RNA RESP QL NAA+PROBE: DETECTED
SODIUM SERPL-SCNC: 135 MMOL/L (ref 136–145)
SOURCE: ABNORMAL
WBC # BLD AUTO: 10 K/UL (ref 4.1–11.1)

## 2024-08-25 PROCEDURE — 72125 CT NECK SPINE W/O DYE: CPT

## 2024-08-25 PROCEDURE — 71045 X-RAY EXAM CHEST 1 VIEW: CPT

## 2024-08-25 PROCEDURE — 6370000000 HC RX 637 (ALT 250 FOR IP): Performed by: EMERGENCY MEDICINE

## 2024-08-25 PROCEDURE — 93005 ELECTROCARDIOGRAM TRACING: CPT | Performed by: EMERGENCY MEDICINE

## 2024-08-25 PROCEDURE — 96360 HYDRATION IV INFUSION INIT: CPT

## 2024-08-25 PROCEDURE — 85025 COMPLETE CBC W/AUTO DIFF WBC: CPT

## 2024-08-25 PROCEDURE — 2580000003 HC RX 258: Performed by: EMERGENCY MEDICINE

## 2024-08-25 PROCEDURE — 70450 CT HEAD/BRAIN W/O DYE: CPT

## 2024-08-25 PROCEDURE — 99285 EMERGENCY DEPT VISIT HI MDM: CPT

## 2024-08-25 PROCEDURE — 36415 COLL VENOUS BLD VENIPUNCTURE: CPT

## 2024-08-25 PROCEDURE — 87636 SARSCOV2 & INF A&B AMP PRB: CPT

## 2024-08-25 PROCEDURE — 80048 BASIC METABOLIC PNL TOTAL CA: CPT

## 2024-08-25 RX ORDER — ACETAMINOPHEN 500 MG
1000 TABLET ORAL
Status: COMPLETED | OUTPATIENT
Start: 2024-08-25 | End: 2024-08-25

## 2024-08-25 RX ORDER — 0.9 % SODIUM CHLORIDE 0.9 %
1000 INTRAVENOUS SOLUTION INTRAVENOUS ONCE
Status: COMPLETED | OUTPATIENT
Start: 2024-08-25 | End: 2024-08-25

## 2024-08-25 RX ADMIN — SODIUM CHLORIDE 1000 ML: 9 INJECTION, SOLUTION INTRAVENOUS at 02:44

## 2024-08-25 RX ADMIN — ACETAMINOPHEN 1000 MG: 500 TABLET ORAL at 02:36

## 2024-08-25 ASSESSMENT — PAIN - FUNCTIONAL ASSESSMENT: PAIN_FUNCTIONAL_ASSESSMENT: NONE - DENIES PAIN

## 2024-08-25 NOTE — ED NOTES
Condition Stable  Patient discharged to home  Patient education was completed  Education taught to patient and wife  Teaching method used was handout and verbal  Understanding of teaching was good  Patient was discharged ambulatory  with assistance to wheelchair  Discharged with wife  Valuables were given to patient remained in possession of belongings during stay

## 2024-08-25 NOTE — ED PROVIDER NOTES
hypertension     History of elevated PSA     2.4 (11/2012), 3.6 (1/2014), 2.1 (3/2015) evaluated by Dr. TENA Washington, repeat PSA 1.9 (2/21/14); 2.1 (3/16/15), 2.4 (3/11/16), 2.7 (9/2016) - referred back to Dr. Washington    Hypercholesterolemia     Incomplete RBBB 1/10/14    Obesity     Prediabetes 5/13/14         SURGICAL HISTORY       Past Surgical History:   Procedure Laterality Date    COLONOSCOPY  11/17/16    Dr. Beltran    COLONOSCOPY N/A 11/16/2016    COLONOSCOPY performed by Jordyn Beltran MD at [facility] ENDOSCOPY         CURRENT MEDICATIONS       Previous Medications    ATORVASTATIN (LIPITOR) 10 MG TABLET    Take 1 tablet by mouth daily    LOSARTAN (COZAAR) 50 MG TABLET    Take 1 tablet by mouth daily       ALLERGIES     Patient has no known allergies.    FAMILY HISTORY       Family History   Problem Relation Age of Onset    Cancer Brother         bone    No Known Problems Son     Diabetes Mother     Stroke Mother     Cancer Father         colon          SOCIAL HISTORY       Social History     Socioeconomic History    Marital status:    Tobacco Use    Smoking status: Never    Smokeless tobacco: Never   Vaping Use    Vaping status: Never Used   Substance and Sexual Activity    Alcohol use: Yes     Alcohol/week: 0.0 standard drinks of alcohol    Drug use: No   Social History Narrative     with 1 adult son  Works from home, part time, on computer, 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 Ddx includes cardiogenic (AV block, dysrythmia, cardiomyopathy, less likely ACS) vs electrolyte/metabolic vs severe anemia/GIB vs infectious process vs hypovolemia/dehydration vs vasovagal/neurogenic vs seizure vs polypharmacy/tox, less likely acute vestibular syndrome or acute intracranial process (SAH, ICH, CVA) based on nonfocal neuro exam and no AMS. Ordered CT head/cspine, EKG, cxr, labs. Give ivf and tylenol. Reassess.    0400 CT head/cspine obtained given fall and neg for acute findings including trauma. EKG SR w/o ischemic changes. CXR clear. Labs show mild renal insufficiency. COVID+. Gave tylenol and IVF w/ improvement in symptoms. Low concern for severe sepsis. Started on paxlovid.    Patient given specific return precautions and explained signs/symptoms for which to come back to ED immediately but otherwise advised to f/u w/ PCP over next 2-3days.    Patient given specific return precautions and explained signs/symptoms for which to come back to ED immediately but otherwise advised to f/u w/ PCP over next 2-3days.      Amount and/or Complexity of Data Reviewed  Independent Historian: afshan  Labs: ordered. Decision-making details documented in ED Course.  Radiology: ordered. Decision-making details documented in ED Course.  ECG/medicine tests: ordered and independent interpretation performed. Decision-making details documented in ED Course.    Risk  OTC drugs.  Prescription drug management.            ED Course            CONSULTS:  None    PROCEDURES:  Unless otherwise noted below, none     Procedures      FINAL IMPRESSION      1. COVID-19 virus infection    2. Ground-level fall    3. Renal insufficiency          DISPOSITION/PLAN   DISPOSITION Decision To Discharge 08/25/2024 04:00:23 AM      PATIENT REFERRED TO:  No follow-up provider specified.    DISCHARGE MEDICATIONS:  New Prescriptions    NIRMATRELVIR/RITONAVIR 300/100 (PAXLOVID, 300/100,) 20 X 150 MG & 10 X 100MG TBPK    Take 3

## 2024-08-25 NOTE — ED NOTES
Patient up to bedside. Patient unable to urinate at this time. \"I can't pee\". \"I peed before I got here\"

## 2024-08-25 NOTE — ED TRIAGE NOTES
[facility] Nursing Note        Patient Name: Cory Lozano      : 1950             MRN: [id number]      Chief Complaint:  Generalized Weakness      Admit Diagnosis: No admission diagnoses are documented for this encounter.      Admitting Provider: No admitting provider for patient encounter.      Surgery: * No surgery found *           Patient arrived to the ER via EMS from home with complaints of Generalized Weakness that started today. Per EMS pt had a Fall yesterday without any injuries. Patient's wife is COVID + per EMS.         Lines:        Signed by: Dwight Jimenez RN, CLIFF, BSN, CMSRN                                              2024 at 2:31 AM

## 2024-09-05 ENCOUNTER — TELEPHONE (OUTPATIENT)
Age: 74
End: 2024-09-05

## 2024-09-05 NOTE — TELEPHONE ENCOUNTER
----- Message from Dave NAVARRO sent at 9/5/2024 12:00 PM EDT -----  Regarding: ECC Appointment Request  ECC Appointment Request    Patient needs appointment for ECC Appointment Type: New to Provider.    Patient Requested Dates(s): September 9 or 10, 2024  Patient Requested Time: any time  Provider Name: preferably an MD    Reason for Appointment Request: Established Patient - Available appointments did not meet patient need  --------------------------------------------------------------------------------------------------------------------------    Relationship to Patient: Spouse/Partner ; Candie RUCKER     Call Back Information: OK to leave message on voicemail  Preferred Call Back Number: Phone : [phone]

## 2024-09-12 ENCOUNTER — OFFICE VISIT (OUTPATIENT)
Age: 74
End: 2024-09-12
Payer: MEDICARE

## 2024-09-12 VITALS
SYSTOLIC BLOOD PRESSURE: 126 MMHG | TEMPERATURE: 97.5 F | RESPIRATION RATE: 14 BRPM | OXYGEN SATURATION: 96 % | DIASTOLIC BLOOD PRESSURE: 81 MMHG | HEART RATE: 77 BPM | HEIGHT: 65 IN | BODY MASS INDEX: 33.26 KG/M2 | WEIGHT: 199.6 LBS

## 2024-09-12 DIAGNOSIS — E78.00 HYPERCHOLESTEROLEMIA: ICD-10-CM
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
## 2024-09-12 DIAGNOSIS — I10 BENIGN ESSENTIAL HYPERTENSION: ICD-10-CM

## 2024-09-12 DIAGNOSIS — R73.03 PREDIABETES: ICD-10-CM

## 2024-09-12 PROCEDURE — 99214 OFFICE O/P EST MOD 30 MIN: CPT | Performed by: NURSE PRACTITIONER

## 2024-09-12 RX ORDER — LOSARTAN POTASSIUM 50 MG/1
50 TABLET ORAL DAILY
Qty: 90 TABLET | Refills: 3 | Status: SHIPPED | OUTPATIENT
Start: 2024-09-12

## 2024-09-12 RX ORDER — ATORVASTATIN CALCIUM 10 MG/1
10 TABLET, FILM COATED ORAL DAILY
Qty: 90 TABLET | Refills: 3 | Status: SHIPPED | OUTPATIENT
Start: 2024-09-12

## 2024-09-12 SDOH — HEALTH STABILITY: PHYSICAL HEALTH: ON AVERAGE, HOW MANY DAYS PER WEEK DO YOU ENGAGE IN MODERATE TO STRENUOUS EXERCISE (LIKE A BRISK WALK)?: 3 DAYS

## 2024-09-12 ASSESSMENT — ENCOUNTER SYMPTOMS
SHORTNESS OF BREATH: 0
COUGH: 0

## 2025-01-15 ENCOUNTER — OFFICE VISIT (OUTPATIENT)
Age: 75
End: 2025-01-15
Payer: MEDICARE

## 2025-01-15 VITALS
SYSTOLIC BLOOD PRESSURE: 113 MMHG | BODY MASS INDEX: 34.12 KG/M2 | OXYGEN SATURATION: 98 % | RESPIRATION RATE: 12 BRPM | DIASTOLIC BLOOD PRESSURE: 75 MMHG | HEIGHT: 65 IN | HEART RATE: 66 BPM | WEIGHT: 204.8 LBS | TEMPERATURE: 97.1 F

## 2025-01-15 DIAGNOSIS — E78.00 HYPERCHOLESTEROLEMIA: ICD-10-CM

## 2025-01-15 DIAGNOSIS — I10 ESSENTIAL (PRIMARY) HYPERTENSION: Primary | ICD-10-CM

## 2025-01-15 DIAGNOSIS — R73.03 PREDIABETES: ICD-10-CM

## 2025-01-15 PROCEDURE — 3017F COLORECTAL CA SCREEN DOC REV: CPT | Performed by: NURSE PRACTITIONER

## 2025-01-15 PROCEDURE — 1159F MED LIST DOCD IN RCRD: CPT | Performed by: NURSE PRACTITIONER

## 2025-01-15 PROCEDURE — 3074F SYST BP LT 130 MM HG: CPT | Performed by: NURSE PRACTITIONER

## 2025-01-15 PROCEDURE — 1126F AMNT PAIN NOTED NONE PRSNT: CPT | Performed by: NURSE PRACTITIONER

## 2025-01-15 PROCEDURE — 1036F TOBACCO NON-USER: CPT | Performed by: NURSE PRACTITIONER

## 2025-01-15 PROCEDURE — G8417 CALC BMI ABV UP PARAM F/U: HCPCS | Performed by: NURSE PRACTITIONER

## 2025-01-15 PROCEDURE — 99214 OFFICE O/P EST MOD 30 MIN: CPT | Performed by: NURSE PRACTITIONER

## 2025-01-15 PROCEDURE — 1123F ACP DISCUSS/DSCN MKR DOCD: CPT | Performed by: NURSE PRACTITIONER

## 2025-01-15 PROCEDURE — 3078F DIAST BP <80 MM HG: CPT | Performed by: NURSE PRACTITIONER

## 2025-01-15 PROCEDURE — G8427 DOCREV CUR MEDS BY ELIG CLIN: HCPCS | Performed by: NURSE PRACTITIONER

## 2025-01-15 RX ORDER — LOSARTAN POTASSIUM 50 MG/1
50 TABLET ORAL DAILY
Qty: 90 TABLET | Refills: 3 | Status: SHIPPED | OUTPATIENT
Start: 2025-01-15

## 2025-01-15 RX ORDER — ATORVASTATIN CALCIUM 10 MG/1
10 TABLET, FILM COATED ORAL DAILY
Qty: 90 TABLET | Refills: 3 | Status: SHIPPED | OUTPATIENT
Start: 2025-01-15

## 2025-01-15 SDOH — ECONOMIC STABILITY: FOOD INSECURITY: WITHIN THE PAST 12 MONTHS, THE FOOD YOU BOUGHT JUST DIDN'T LAST AND YOU DIDN'T HAVE MONEY TO GET MORE.: NEVER TRUE

## 2025-01-15 SDOH — ECONOMIC STABILITY: FOOD INSECURITY: WITHIN THE PAST 12 MONTHS, YOU WORRIED THAT YOUR FOOD WOULD RUN OUT BEFORE YOU GOT MONEY TO BUY MORE.: NEVER TRUE

## 2025-01-15 ASSESSMENT — PATIENT HEALTH QUESTIONNAIRE - PHQ9
2. FEELING DOWN, DEPRESSED OR HOPELESS: NOT AT ALL
1. LITTLE INTEREST OR PLEASURE IN DOING THINGS: NOT AT ALL
SUM OF ALL RESPONSES TO PHQ QUESTIONS 1-9: 0
SUM OF ALL RESPONSES TO PHQ9 QUESTIONS 1 & 2: 0

## 2025-01-15 ASSESSMENT — ENCOUNTER SYMPTOMS
SHORTNESS OF BREATH: 0
COUGH: 0

## 2025-01-15 NOTE — PROGRESS NOTES
Call placed to schedule  scheduled  4/26/22 at 1230 with Ashley Moreno NP virtual appt   Chief Complaint   Patient presents with    Fall     4 month follow up     \"Have you been to the ER, urgent care clinic since your last visit?  Hospitalized since your last visit?\"    NO    “Have you seen or consulted any other health care providers outside of [facility] since your last visit?”    NO                   1/15/2025     9:50 AM   PHQ-9    Little interest or pleasure in doing things 0   Feeling down, depressed, or hopeless 0   PHQ-2 Score 0   PHQ-9 Total Score 0           Financial Resource Strain: Low Risk  (7/2/2024)    Overall Financial Resource Strain (CARDIA)     Difficulty of Paying Living Expenses: Not hard at all      Food Insecurity: No Food Insecurity (1/15/2025)    Hunger Vital Sign     Worried About Running Out of Food in the Last Year: Never true     Ran Out of Food in the Last Year: Never true          Health Maintenance Due   Topic Date Due    Hepatitis C screen  Never done    Shingles vaccine (2 of 3) 01/26/2015    Prostate Specific Antigen (PSA) Screening or Monitoring  06/14/2023    Flu vaccine (1) 08/01/2024    COVID-19 Vaccine (3 - 2023-24 season) 09/01/2024    Annual Wellness Visit (Medicare)  01/02/2025    A1C test (Diabetic or Prediabetic)  01/02/2025    Lipids  01/02/2025    Depression Screen  01/02/2025

## 2025-01-15 NOTE — PROGRESS NOTES
History of present illness:Cory Lozano is a 74 y.o. male presenting for follow up on falls, HLD, HTN.    #HTN  - Doing well wit his losartan and denies any concerns. Blood pressure well WNL. Denies any cardiovascular symptoms.    #Falls  - No repeated falls and feels back to normal. Feels, as I do, that his falls were related to illness.    #HLD  - Has restarted his atorvastatin. Agreeable to labs today.    #BPH with elevated PSA  - Followed by Urology and has had serial PSAs. Plan is for MRI in March.    Review of Systems   Constitutional:  Negative for activity change and appetite change.   Respiratory:  Negative for cough and shortness of breath.    Cardiovascular:  Negative for chest pain.   Neurological:  Negative for weakness.   All other systems reviewed and are negative.          Past Medical History:   Diagnosis Date    Benign essential hypertension     History of elevated PSA     2.4 (11/2012), 3.6 (1/2014), 2.1 (3/2015) evaluated by Dr. TENA Washington, repeat PSA 1.9 (2/21/14); 2.1 (3/16/15), 2.4 (3/11/16), 2.7 (9/2016) - referred back to Dr. Washington    Hypercholesterolemia     Incomplete RBBB 1/10/14    Obesity     Prediabetes 5/13/14     Past Surgical History:   Procedure Laterality Date    COLONOSCOPY  11/17/16    Dr. Beltran    COLONOSCOPY N/A 11/16/2016    COLONOSCOPY performed by Jordyn [facility]tran MD at [facility] ENDOSCOPY     Family History   Problem Relation Age of Onset    Cancer Brother         bone    No Known Problems Son     Diabetes Mother     Stroke Mother     Cancer Father         colon       Prior to Admission medications    Medication Sig Start Date End Date Taking? Authorizing Provider   atorvastatin (LIPITOR) 10 MG tablet Take 1 tablet by mouth daily 1/15/25  Yes Wily Ba APRN - CNP   losartan (COZAAR) 50 MG tablet Take 1 tablet by mouth daily 1/15/25  Yes Wily Ba APRN - CNP        No Known Allergies    Vitals:    01/15/25 0953   BP: 113/75   Site: Left Upper Arm   Position:

## 2025-01-16 LAB
ALBUMIN SERPL-MCNC: 4 G/DL (ref 3.5–5)
ALBUMIN/GLOB SERPL: 1.1 (ref 1.1–2.2)
ALP SERPL-CCNC: 72 U/L (ref 45–117)
ALT SERPL-CCNC: 41 U/L (ref 12–78)
ANION GAP SERPL CALC-SCNC: 2 MMOL/L (ref 2–12)
AST SERPL-CCNC: 25 U/L (ref 15–37)
BILIRUB SERPL-MCNC: 0.5 MG/DL (ref 0.2–1)
BUN SERPL-MCNC: 17 MG/DL (ref 6–20)
BUN/CREAT SERPL: 13 (ref 12–20)
CALCIUM SERPL-MCNC: 9.1 MG/DL (ref 8.5–10.1)
CHLORIDE SERPL-SCNC: 105 MMOL/L (ref 97–108)
CHOLEST SERPL-MCNC: 177 MG/DL
CO2 SERPL-SCNC: 28 MMOL/L (ref 21–32)
CREAT SERPL-MCNC: 1.3 MG/DL (ref 0.7–1.3)
ERYTHROCYTE [DISTWIDTH] IN BLOOD BY AUTOMATED COUNT: 12.4 % (ref 11.5–14.5)
EST. AVERAGE GLUCOSE BLD GHB EST-MCNC: 131 MG/DL
GLOBULIN SER CALC-MCNC: 3.5 G/DL (ref 2–4)
GLUCOSE SERPL-MCNC: 116 MG/DL (ref 65–100)
HBA1C MFR BLD: 6.2 % (ref 4–5.6)
HCT VFR BLD AUTO: 42.3 % (ref 36.6–50.3)
HDLC SERPL-MCNC: 49 MG/DL
HDLC SERPL: 3.6 (ref 0–5)
HGB BLD-MCNC: 14.5 G/DL (ref 12.1–17)
LDLC SERPL CALC-MCNC: 98.4 MG/DL (ref 0–100)
MCH RBC QN AUTO: 30.8 PG (ref 26–34)
MCHC RBC AUTO-ENTMCNC: 34.3 G/DL (ref 30–36.5)
MCV RBC AUTO: 89.8 FL (ref 80–99)
NRBC # BLD: 0 K/UL (ref 0–0.01)
NRBC BLD-RTO: 0 PER 100 WBC
PLATELET # BLD AUTO: 248 K/UL (ref 150–400)
PMV BLD AUTO: 10.6 FL (ref 8.9–12.9)
POTASSIUM SERPL-SCNC: 4.3 MMOL/L (ref 3.5–5.1)
PROT SERPL-MCNC: 7.5 G/DL (ref 6.4–8.2)
RBC # BLD AUTO: 4.71 M/UL (ref 4.1–5.7)
SODIUM SERPL-SCNC: 135 MMOL/L (ref 136–145)
TRIGL SERPL-MCNC: 148 MG/DL
VLDLC SERPL CALC-MCNC: 29.6 MG/DL
WBC # BLD AUTO: 5.6 K/UL (ref 4.1–11.1)

## 2025-06-12 ENCOUNTER — TELEPHONE (OUTPATIENT)
Age: 75
End: 2025-06-12

## 2025-06-12 NOTE — TELEPHONE ENCOUNTER
Left VM for pt to poly appt he has 7/15 at 10 am due to GRACIELA Ba out of office that week.-TM 6/12/25.

## 2025-07-23 ENCOUNTER — OFFICE VISIT (OUTPATIENT)
Age: 75
End: 2025-07-23
Payer: MEDICARE

## 2025-07-23 VITALS
RESPIRATION RATE: 16 BRPM | HEIGHT: 65 IN | SYSTOLIC BLOOD PRESSURE: 121 MMHG | OXYGEN SATURATION: 96 % | HEART RATE: 69 BPM | WEIGHT: 208.4 LBS | TEMPERATURE: 97.3 F | DIASTOLIC BLOOD PRESSURE: 84 MMHG | BODY MASS INDEX: 34.72 KG/M2

## 2025-07-23 DIAGNOSIS — R73.03 PREDIABETES: ICD-10-CM

## 2025-07-23 DIAGNOSIS — N40.1 BENIGN PROSTATIC HYPERPLASIA WITH URINARY FREQUENCY: Primary | ICD-10-CM

## 2025-07-23 DIAGNOSIS — Z00.00 MEDICARE ANNUAL WELLNESS VISIT, SUBSEQUENT: ICD-10-CM

## 2025-07-23 DIAGNOSIS — R35.0 BENIGN PROSTATIC HYPERPLASIA WITH URINARY FREQUENCY: Primary | ICD-10-CM

## 2025-07-23 DIAGNOSIS — R29.898 BILATERAL LEG WEAKNESS: ICD-10-CM

## 2025-07-23 DIAGNOSIS — I10 ESSENTIAL (PRIMARY) HYPERTENSION: ICD-10-CM

## 2025-07-23 DIAGNOSIS — E78.00 HYPERCHOLESTEROLEMIA: ICD-10-CM

## 2025-07-23 LAB — HBA1C MFR BLD: 6.1 %

## 2025-07-23 PROCEDURE — PBSHW AMB POC HEMOGLOBIN A1C: Performed by: NURSE PRACTITIONER

## 2025-07-23 PROCEDURE — 83036 HEMOGLOBIN GLYCOSYLATED A1C: CPT | Performed by: NURSE PRACTITIONER

## 2025-07-23 PROCEDURE — G0439 PPPS, SUBSEQ VISIT: HCPCS | Performed by: NURSE PRACTITIONER

## 2025-07-23 PROCEDURE — 3017F COLORECTAL CA SCREEN DOC REV: CPT | Performed by: NURSE PRACTITIONER

## 2025-07-23 PROCEDURE — G8417 CALC BMI ABV UP PARAM F/U: HCPCS | Performed by: NURSE PRACTITIONER

## 2025-07-23 PROCEDURE — 1036F TOBACCO NON-USER: CPT | Performed by: NURSE PRACTITIONER

## 2025-07-23 PROCEDURE — 3079F DIAST BP 80-89 MM HG: CPT | Performed by: NURSE PRACTITIONER

## 2025-07-23 PROCEDURE — 99214 OFFICE O/P EST MOD 30 MIN: CPT | Performed by: NURSE PRACTITIONER

## 2025-07-23 PROCEDURE — 1126F AMNT PAIN NOTED NONE PRSNT: CPT | Performed by: NURSE PRACTITIONER

## 2025-07-23 PROCEDURE — 1123F ACP DISCUSS/DSCN MKR DOCD: CPT | Performed by: NURSE PRACTITIONER

## 2025-07-23 PROCEDURE — 3074F SYST BP LT 130 MM HG: CPT | Performed by: NURSE PRACTITIONER

## 2025-07-23 PROCEDURE — 1159F MED LIST DOCD IN RCRD: CPT | Performed by: NURSE PRACTITIONER

## 2025-07-23 PROCEDURE — G8427 DOCREV CUR MEDS BY ELIG CLIN: HCPCS | Performed by: NURSE PRACTITIONER

## 2025-07-23 PROCEDURE — G2211 COMPLEX E/M VISIT ADD ON: HCPCS | Performed by: NURSE PRACTITIONER

## 2025-07-23 RX ORDER — LOSARTAN POTASSIUM 50 MG/1
50 TABLET ORAL DAILY
Qty: 90 TABLET | Refills: 3 | Status: SHIPPED | OUTPATIENT
Start: 2025-07-23

## 2025-07-23 RX ORDER — ATORVASTATIN CALCIUM 10 MG/1
10 TABLET, FILM COATED ORAL DAILY
Qty: 90 TABLET | Refills: 3 | Status: SHIPPED | OUTPATIENT
Start: 2025-07-23

## 2025-07-23 ASSESSMENT — PATIENT HEALTH QUESTIONNAIRE - PHQ9
1. LITTLE INTEREST OR PLEASURE IN DOING THINGS: NOT AT ALL
SUM OF ALL RESPONSES TO PHQ QUESTIONS 1-9: 0
2. FEELING DOWN, DEPRESSED OR HOPELESS: NOT AT ALL
SUM OF ALL RESPONSES TO PHQ QUESTIONS 1-9: 0

## 2025-07-23 ASSESSMENT — LIFESTYLE VARIABLES
HOW MANY STANDARD DRINKS CONTAINING ALCOHOL DO YOU HAVE ON A TYPICAL DAY: PATIENT DOES NOT DRINK
HOW OFTEN DO YOU HAVE A DRINK CONTAINING ALCOHOL: NEVER

## 2025-07-23 NOTE — PROGRESS NOTES
Chief Complaint   Patient presents with    Medicare AWV    Hypertension     6 month follow up blood pressure check      \"Have you been to the ER, urgent care clinic since your last visit?  Hospitalized since your last visit?\"    NO    “Have you seen or consulted any other health care providers outside of [facility] since your last visit?”    NO                   7/23/2025    10:03 AM   PHQ-9    Little interest or pleasure in doing things 0   Feeling down, depressed, or hopeless 0   PHQ-2 Score 0   PHQ-9 Total Score 0           Financial Resource Strain: Low Risk  (7/2/2024)    Overall Financial Resource Strain (CARDIA)     Difficulty of Paying Living Expenses: Not hard at all      Food Insecurity: No Food Insecurity (1/15/2025)    Hunger Vital Sign     Worried About Running Out of Food in the Last Year: Never true     Ran Out of Food in the Last Year: Never true          Health Maintenance Due   Topic Date Due    Hepatitis C screen  Never done    Annual Wellness Visit (Medicare)  01/02/2025

## 2025-07-24 NOTE — PROGRESS NOTES
Medicare Annual Wellness Visit    Cory Lozano is here for Medicare AWV and Hypertension (6 month follow up blood pressure check )    Assessment & Plan  1. Prediabetes: Stable. A1c 6.1% on 01/2026.  - Discussed potential benefits of metformin in delaying progression of prediabetes; prefers lifestyle modifications.  - Engage in regular physical activity, such as walking for 30 to 45 minutes each morning.  - Maintain a healthy diet.  - Repeat A1c test conducted today, 6.1%, stable.    2. Hypertension: Stable.  - Blood pressure readings within normal range.  - Refills for losartan provided.  - Continue current antihypertensive therapy.    3. Hyperlipidemia: Stable. Cholesterol levels satisfactory in 01/2025.  - Refills for atorvastatin provided.  - Continue current lipid-lowering therapy.    4. Prostate lesion: Stable. MRI findings showed a tiny lesion.  - Continue surveillance and follow-up with urologist.    5. Fall risk.  - Referral for physical therapy made to address difficulty getting up after sitting for extended periods and improve strength and mobility.    Follow-up: 01/2026.  Benign prostatic hyperplasia with urinary frequency  Hypercholesterolemia  -     atorvastatin (LIPITOR) 10 MG tablet; Take 1 tablet by mouth daily, Disp-90 tablet, R-3Normal  Essential (primary) hypertension  -     losartan (COZAAR) 50 MG tablet; Take 1 tablet by mouth daily, Disp-90 tablet, R-3Normal  Prediabetes  -     AMB POC HEMOGLOBIN A1C  Bilateral leg weakness  -     [facility] - Physical Therapy at [address], [place] Medicare annual wellness visit, subsequent    Results       Return in about 6 months (around 1/23/2026) for BLOOD PRESSURE CHECK.     Subjective     History of Present Illness  The patient is a 74-year-old male who presents today for his Medicare annual wellness visit and hypertension follow-up.    Prediabetes  - He reports no current health concerns.  - He has been in the prediabetic range for an extended period,

## 2025-07-25 ENCOUNTER — HOSPITAL ENCOUNTER (OUTPATIENT)
Facility: HOSPITAL | Age: 75
Setting detail: RECURRING SERIES
Discharge: HOME | End: 2025-07-28
Payer: MEDICARE

## 2025-07-25 PROCEDURE — 97162 PT EVAL MOD COMPLEX 30 MIN: CPT

## 2025-07-25 PROCEDURE — 97110 THERAPEUTIC EXERCISES: CPT

## 2025-07-25 NOTE — THERAPY EVALUATION
Physical Therapy at [facility],   a part of [facility]  [address] [address]  Phone:[phone] Fax:[phone]                                                                            PHYSICAL THERAPY - MEDICARE EVALUATION/PLAN OF CARE NOTE (updated 3/23)      Date: 2025          Patient Name:  Cory Lozano :  1950   Medical   Diagnosis:  Bilateral leg weakness [R29.898] Treatment Diagnosis:  M62.81  GENERAL MUSCLE WEAKNESS    Referral Source:  Wily Ba APRN -* Provider #:  [id number]                  Insurance: Payor: MEDICARE / Plan: MEDICARE PART A AND B / Product Type: *No Product type* /      Patient  verified yes     Visit #   Current  / Total 1 24   Time   In / Out 935 A 10:15 A   Total Treatment Time 40   Total Timed Codes 15   1:1 Treatment Time 15      [facility] Totals Reminder:  bill using total billable   min of TIMED therapeutic procedures and modalities.   8-22 min = 1 unit; 23-37 min = 2 units; 38-52 min = 3 units;  53-67 min = 4 units; 68-82 min = 5 units           SUBJECTIVE  If an interpreting service was utilized for treatment of this patient, the contents of this document represent the material reviewed with the patient via the .     Pain Level (0-10 scale): 0  []constant []intermittent improving []worsening []no change since onset    Any medication changes, allergies to medications, adverse drug reactions, diagnosis change, or new procedure performed?: [x] No    [] Yes (see summary sheet for update)  Medications: Verified on Patient Summary List    Subjective functional status/changes:     Pt presents with referral for B leg weakness. He did have a fall ~1 mo ago-- states it was not a bad fall, no injury. He has noticed dec'd strength in his legs, raul when walking or playing golf. He tries to walk around neighborhood ~3-4x/wk for ~15 min. No pain.    Start of Care: 2025  Onset Date: gradual

## 2025-08-01 ENCOUNTER — HOSPITAL ENCOUNTER (OUTPATIENT)
Facility: HOSPITAL | Age: 75
Setting detail: RECURRING SERIES
Discharge: HOME | End: 2025-08-04
Payer: MEDICARE

## 2025-08-01 PROCEDURE — 97112 NEUROMUSCULAR REEDUCATION: CPT

## 2025-08-01 PROCEDURE — 97110 THERAPEUTIC EXERCISES: CPT

## 2025-08-05 ENCOUNTER — HOSPITAL ENCOUNTER (OUTPATIENT)
Facility: HOSPITAL | Age: 75
Setting detail: RECURRING SERIES
Discharge: HOME | End: 2025-08-08
Payer: MEDICARE

## 2025-08-05 PROCEDURE — 97112 NEUROMUSCULAR REEDUCATION: CPT

## 2025-08-05 PROCEDURE — 97110 THERAPEUTIC EXERCISES: CPT

## 2025-08-08 ENCOUNTER — HOSPITAL ENCOUNTER (OUTPATIENT)
Facility: HOSPITAL | Age: 75
Setting detail: RECURRING SERIES
Discharge: HOME | End: 2025-08-11
Payer: MEDICARE

## 2025-08-08 PROCEDURE — 97110 THERAPEUTIC EXERCISES: CPT

## 2025-08-08 PROCEDURE — 97112 NEUROMUSCULAR REEDUCATION: CPT

## 2025-08-12 ENCOUNTER — HOSPITAL ENCOUNTER (OUTPATIENT)
Facility: HOSPITAL | Age: 75
Setting detail: RECURRING SERIES
Discharge: HOME | End: 2025-08-15
Payer: MEDICARE

## 2025-08-12 PROCEDURE — 97112 NEUROMUSCULAR REEDUCATION: CPT

## 2025-08-12 PROCEDURE — 97110 THERAPEUTIC EXERCISES: CPT

## 2025-08-15 ENCOUNTER — HOSPITAL ENCOUNTER (OUTPATIENT)
Facility: HOSPITAL | Age: 75
Setting detail: RECURRING SERIES
Discharge: HOME | End: 2025-08-18
Payer: MEDICARE

## 2025-08-15 PROCEDURE — 97140 MANUAL THERAPY 1/> REGIONS: CPT

## 2025-08-15 PROCEDURE — 97110 THERAPEUTIC EXERCISES: CPT

## 2025-08-15 PROCEDURE — 97112 NEUROMUSCULAR REEDUCATION: CPT

## 2025-08-19 ENCOUNTER — HOSPITAL ENCOUNTER (OUTPATIENT)
Facility: HOSPITAL | Age: 75
Setting detail: RECURRING SERIES
Discharge: HOME | End: 2025-08-22
Payer: MEDICARE

## 2025-08-19 PROCEDURE — 97110 THERAPEUTIC EXERCISES: CPT

## 2025-08-19 PROCEDURE — 97112 NEUROMUSCULAR REEDUCATION: CPT

## 2025-08-22 ENCOUNTER — HOSPITAL ENCOUNTER (OUTPATIENT)
Facility: HOSPITAL | Age: 75
Setting detail: RECURRING SERIES
Discharge: HOME | End: 2025-08-25
Payer: MEDICARE

## 2025-08-22 PROCEDURE — 97112 NEUROMUSCULAR REEDUCATION: CPT

## 2025-08-22 PROCEDURE — 97110 THERAPEUTIC EXERCISES: CPT

## 2025-08-26 ENCOUNTER — HOSPITAL ENCOUNTER (OUTPATIENT)
Facility: HOSPITAL | Age: 75
Setting detail: RECURRING SERIES
Discharge: HOME | End: 2025-08-29
Payer: MEDICARE

## 2025-08-26 PROCEDURE — 97110 THERAPEUTIC EXERCISES: CPT

## 2025-08-26 PROCEDURE — 97112 NEUROMUSCULAR REEDUCATION: CPT

## 2025-08-29 ENCOUNTER — HOSPITAL ENCOUNTER (OUTPATIENT)
Facility: HOSPITAL | Age: 75
Setting detail: RECURRING SERIES
Discharge: HOME | End: 2025-09-01
Payer: MEDICARE

## 2025-08-29 PROCEDURE — 97112 NEUROMUSCULAR REEDUCATION: CPT

## 2025-08-29 PROCEDURE — 97110 THERAPEUTIC EXERCISES: CPT

## 2025-09-03 ENCOUNTER — HOSPITAL ENCOUNTER (OUTPATIENT)
Facility: HOSPITAL | Age: 75
Setting detail: RECURRING SERIES
Discharge: HOME | End: 2025-09-06
Payer: MEDICARE

## 2025-09-03 PROCEDURE — 97110 THERAPEUTIC EXERCISES: CPT

## 2025-09-03 PROCEDURE — 97112 NEUROMUSCULAR REEDUCATION: CPT
